# Patient Record
Sex: FEMALE | Race: WHITE | NOT HISPANIC OR LATINO | Employment: FULL TIME | URBAN - METROPOLITAN AREA
[De-identification: names, ages, dates, MRNs, and addresses within clinical notes are randomized per-mention and may not be internally consistent; named-entity substitution may affect disease eponyms.]

---

## 2023-05-04 LAB
AMPHETAMINE (PRESENCE) IN URINE BY SCREEN METHOD: NORMAL
BARBITURATES PRESENCE IN URINE BY SCREEN METHOD: NORMAL
BENZODIAZEPINE (PRESENCE) IN URINE BY SCREEN METHOD: NORMAL
CANNABINOIDS IN URINE BY SCREEN METHOD: NORMAL
COCAINE (PRESENCE) IN URINE BY SCREEN METHOD: NORMAL
DRUG SCREEN COMMENT URINE: NORMAL
FENTANYL URINE: NORMAL
METHADONE (PRESENCE) IN URINE BY SCREEN METHOD: NORMAL
OPIATES (PRESENCE) IN URINE BY SCREEN METHOD: NORMAL
OXYCODONE (PRESENCE) IN URINE BY SCREEN METHOD: NORMAL
PHENCYCLIDINE (PRESENCE) IN URINE BY SCREEN METHOD: NORMAL

## 2023-05-31 LAB
ANION GAP IN SER/PLAS: 12 MMOL/L (ref 10–20)
C REACTIVE PROTEIN (MG/L) IN SER/PLAS BY HIGH SENSIT: 2.6 MG/L
CALCIUM (MG/DL) IN SER/PLAS: 9 MG/DL (ref 8.6–10.6)
CARBON DIOXIDE, TOTAL (MMOL/L) IN SER/PLAS: 29 MMOL/L (ref 21–32)
CHLORIDE (MMOL/L) IN SER/PLAS: 103 MMOL/L (ref 98–107)
CHOLESTEROL (MG/DL) IN SER/PLAS: 214 MG/DL (ref 0–199)
CHOLESTEROL IN HDL (MG/DL) IN SER/PLAS: 44.2 MG/DL
CHOLESTEROL/HDL RATIO: 4.8
CREATININE (MG/DL) IN SER/PLAS: 0.73 MG/DL (ref 0.5–1.05)
GFR FEMALE: >90 ML/MIN/1.73M2
GLUCOSE (MG/DL) IN SER/PLAS: 86 MG/DL (ref 74–99)
LDL: ABNORMAL MG/DL (ref 0–99)
NON HDL CHOLESTEROL: 170 MG/DL
POTASSIUM (MMOL/L) IN SER/PLAS: 3.8 MMOL/L (ref 3.5–5.3)
SODIUM (MMOL/L) IN SER/PLAS: 140 MMOL/L (ref 136–145)
TRIGLYCERIDE (MG/DL) IN SER/PLAS: 431 MG/DL (ref 0–149)
UREA NITROGEN (MG/DL) IN SER/PLAS: 21 MG/DL (ref 6–23)
VLDL: ABNORMAL MG/DL (ref 0–40)

## 2023-06-23 ENCOUNTER — HOSPITAL ENCOUNTER (OUTPATIENT)
Dept: DATA CONVERSION | Facility: HOSPITAL | Age: 63
End: 2023-06-23
Attending: STUDENT IN AN ORGANIZED HEALTH CARE EDUCATION/TRAINING PROGRAM | Admitting: STUDENT IN AN ORGANIZED HEALTH CARE EDUCATION/TRAINING PROGRAM
Payer: COMMERCIAL

## 2023-06-23 DIAGNOSIS — K63.5 POLYP OF COLON: ICD-10-CM

## 2023-06-23 DIAGNOSIS — Z12.11 ENCOUNTER FOR SCREENING FOR MALIGNANT NEOPLASM OF COLON: ICD-10-CM

## 2023-06-23 DIAGNOSIS — J44.9 CHRONIC OBSTRUCTIVE PULMONARY DISEASE, UNSPECIFIED (MULTI): ICD-10-CM

## 2023-06-23 DIAGNOSIS — F17.210 NICOTINE DEPENDENCE, CIGARETTES, UNCOMPLICATED: ICD-10-CM

## 2023-07-03 LAB
COMPLETE PATHOLOGY REPORT: NORMAL
CONVERTED CLINICAL DIAGNOSIS-HISTORY: NORMAL
CONVERTED FINAL DIAGNOSIS: NORMAL
CONVERTED FINAL REPORT PDF LINK TO COPY AND PASTE: NORMAL
CONVERTED GROSS DESCRIPTION: NORMAL

## 2023-10-12 PROBLEM — E78.5 HYPERLIPEMIA: Status: ACTIVE | Noted: 2023-10-12

## 2023-10-12 PROBLEM — D69.6 THROMBOCYTOPENIA (CMS-HCC): Status: ACTIVE | Noted: 2023-10-12

## 2023-10-12 PROBLEM — S33.5XXA LUMBAR SPRAIN: Status: ACTIVE | Noted: 2023-10-12

## 2023-10-12 PROBLEM — J44.9 COPD (CHRONIC OBSTRUCTIVE PULMONARY DISEASE) (MULTI): Status: ACTIVE | Noted: 2023-10-12

## 2023-10-12 PROBLEM — D45 POLYCYTHEMIA VERA (MULTI): Status: ACTIVE | Noted: 2023-10-12

## 2023-10-12 PROBLEM — R53.83 FATIGUE: Status: ACTIVE | Noted: 2023-10-12

## 2023-10-12 PROBLEM — H47.392: Status: ACTIVE | Noted: 2023-10-12

## 2023-10-12 PROBLEM — M54.16 LUMBAR RADICULOPATHY: Status: ACTIVE | Noted: 2023-10-12

## 2023-10-12 PROBLEM — M99.02 SEGMENTAL AND SOMATIC DYSFUNCTION OF THORACIC REGION: Status: ACTIVE | Noted: 2023-10-12

## 2023-10-12 PROBLEM — F17.200 CURRENT EVERY DAY SMOKER: Status: ACTIVE | Noted: 2023-10-12

## 2023-10-12 PROBLEM — G47.33 OSA (OBSTRUCTIVE SLEEP APNEA): Status: ACTIVE | Noted: 2023-10-12

## 2023-10-12 PROBLEM — E16.2 HYPOGLYCEMIA: Status: ACTIVE | Noted: 2023-10-12

## 2023-10-12 PROBLEM — G62.9 NEUROPATHY: Status: ACTIVE | Noted: 2023-10-12

## 2023-10-12 PROBLEM — J45.909 ASTHMA (HHS-HCC): Status: ACTIVE | Noted: 2023-10-12

## 2023-10-12 PROBLEM — I10 HTN (HYPERTENSION): Status: ACTIVE | Noted: 2023-10-12

## 2023-10-12 PROBLEM — E66.01 CLASS 3 SEVERE OBESITY WITH SERIOUS COMORBIDITY AND BODY MASS INDEX (BMI) OF 40.0 TO 44.9 IN ADULT (MULTI): Status: ACTIVE | Noted: 2023-10-12

## 2023-10-12 PROBLEM — E66.813 CLASS 3 SEVERE OBESITY WITH SERIOUS COMORBIDITY AND BODY MASS INDEX (BMI) OF 40.0 TO 44.9 IN ADULT: Status: ACTIVE | Noted: 2023-10-12

## 2023-10-12 PROBLEM — F41.9 ANXIETY: Status: ACTIVE | Noted: 2023-10-12

## 2023-10-12 PROBLEM — B07.9 WARTS: Status: ACTIVE | Noted: 2023-10-12

## 2023-10-12 PROBLEM — M99.05 SEGMENTAL AND SOMATIC DYSFUNCTION OF PELVIC REGION: Status: ACTIVE | Noted: 2023-10-12

## 2023-10-12 PROBLEM — B07.9 CUTANEOUS WART: Status: ACTIVE | Noted: 2023-10-12

## 2023-10-12 PROBLEM — E55.9 VITAMIN D DEFICIENCY: Status: ACTIVE | Noted: 2023-10-12

## 2023-10-12 PROBLEM — H43.813 PVD (POSTERIOR VITREOUS DETACHMENT), BOTH EYES: Status: ACTIVE | Noted: 2023-10-12

## 2023-10-12 PROBLEM — R63.4 WEIGHT REDUCTION: Status: ACTIVE | Noted: 2023-10-12

## 2023-10-12 PROBLEM — M47.816 LUMBAR SPONDYLOSIS: Status: ACTIVE | Noted: 2023-10-12

## 2023-10-12 PROBLEM — D75.1 POLYCYTHEMIA: Status: ACTIVE | Noted: 2023-10-12

## 2023-10-12 PROBLEM — K27.9 PUD (PEPTIC ULCER DISEASE): Status: ACTIVE | Noted: 2023-10-12

## 2023-10-12 PROBLEM — I25.10 CAD (CORONARY ARTERY DISEASE): Status: ACTIVE | Noted: 2023-10-12

## 2023-10-12 PROBLEM — E66.01 MORBID OBESITY WITH BMI OF 40.0-44.9, ADULT (MULTI): Status: ACTIVE | Noted: 2023-10-12

## 2023-10-12 PROBLEM — J32.9 CHRONIC SINUSITIS: Status: ACTIVE | Noted: 2023-10-12

## 2023-10-12 PROBLEM — M99.03 LUMBOSACRAL DYSFUNCTION: Status: ACTIVE | Noted: 2023-10-12

## 2023-10-12 RX ORDER — PANTOPRAZOLE SODIUM 40 MG/1
1 TABLET, DELAYED RELEASE ORAL DAILY
COMMUNITY
Start: 2018-08-29 | End: 2024-02-16 | Stop reason: SDUPTHER

## 2023-10-12 RX ORDER — TOPIRAMATE 50 MG/1
1 TABLET, FILM COATED ORAL 2 TIMES DAILY
COMMUNITY
Start: 2022-03-23 | End: 2023-12-12 | Stop reason: ALTCHOICE

## 2023-10-12 RX ORDER — ACETAMINOPHEN 500 MG
1 TABLET ORAL DAILY
COMMUNITY
Start: 2014-09-15

## 2023-10-12 RX ORDER — ATORVASTATIN CALCIUM 80 MG/1
80 TABLET, FILM COATED ORAL DAILY
COMMUNITY
Start: 2023-05-31 | End: 2023-12-12 | Stop reason: ALTCHOICE

## 2023-10-12 RX ORDER — LIDOCAINE 50 MG/G
1 PATCH TOPICAL DAILY
COMMUNITY
Start: 2016-03-23 | End: 2023-12-12 | Stop reason: ALTCHOICE

## 2023-10-12 RX ORDER — NABUMETONE 750 MG/1
750 TABLET, FILM COATED ORAL EVERY 12 HOURS
COMMUNITY
Start: 2023-05-04 | End: 2024-01-03

## 2023-10-12 RX ORDER — POLYETHYLENE GLYCOL 3350, SODIUM CHLORIDE, SODIUM BICARBONATE, POTASSIUM CHLORIDE 420; 11.2; 5.72; 1.48 G/4L; G/4L; G/4L; G/4L
POWDER, FOR SOLUTION ORAL
COMMUNITY
Start: 2023-06-22 | End: 2023-12-12 | Stop reason: ALTCHOICE

## 2023-10-12 RX ORDER — NALTREXONE HYDROCHLORIDE 50 MG/1
50 TABLET, FILM COATED ORAL DAILY
COMMUNITY
End: 2023-12-12 | Stop reason: ALTCHOICE

## 2023-10-12 RX ORDER — ALBUTEROL SULFATE 90 UG/1
2 AEROSOL, METERED RESPIRATORY (INHALATION) AS NEEDED
COMMUNITY
Start: 2019-05-28

## 2023-10-12 RX ORDER — DULOXETIN HYDROCHLORIDE 60 MG/1
1 CAPSULE, DELAYED RELEASE ORAL DAILY
COMMUNITY
Start: 2016-11-23 | End: 2023-11-22

## 2023-10-12 RX ORDER — TELMISARTAN 20 MG/1
20 TABLET ORAL DAILY
COMMUNITY
Start: 2023-05-05 | End: 2024-02-16 | Stop reason: SDUPTHER

## 2023-10-12 RX ORDER — CLONIDINE 0.3 MG/24H
1 PATCH, EXTENDED RELEASE TRANSDERMAL
COMMUNITY
Start: 2018-12-14 | End: 2023-12-12 | Stop reason: ALTCHOICE

## 2023-10-12 RX ORDER — BENZONATATE 200 MG/1
200 CAPSULE ORAL EVERY 8 HOURS PRN
COMMUNITY
Start: 2023-01-02 | End: 2023-12-12 | Stop reason: ALTCHOICE

## 2023-10-12 RX ORDER — PREDNISONE 20 MG/1
20 TABLET ORAL
COMMUNITY
Start: 2023-05-04 | End: 2023-12-12 | Stop reason: ALTCHOICE

## 2023-10-12 RX ORDER — MULTIVITAMIN WITH IRON
1 TABLET ORAL DAILY
COMMUNITY
Start: 2014-09-15

## 2023-10-12 NOTE — PROGRESS NOTES
Assessment/Plan   The patient's low back pain is consistent with discogenic and radicular pain affecting S1 considering she has radiating symptoms and mild evidence of neurologic deficit in the lower extremities however for the most part her strength is intact. Her neck pain is more consistent with mild radicular pain considering the burning pain into the right periscapular region however there are no overt neurologic deficits in the upper extremities. In general she has chronic pain and has struggled to get rid of her symptoms despite extensive treatment including medications pain management interventions and conservative physical therapy. My treatment will involve very gentle spinal manipulation focusing on thoracolumbar and SIJ regions, with or without dry needling. The patient cannot lie supine so every treatment will be either prone or seated. Patient will be comanaged including pain management/PMR, may need updated L/S MRI pending SCS removal.      Visits this year: 6    Subjective   Patient reports that she is getting a lot of relief from her treatments noting that she felt better after last visit and in general has not had any recent lower extremity pain.  Symptoms are localized to the lower back.  Pain was mild to moderate up until a couple of days ago when symptoms flared to 7 out of 10 becoming more frequent as well without specific injury.  Still working his usual doing a lot of standing and also has been stretching a lot more since her last visit as advised    HPI - LBP 06/07/2023 -patient reports frequent 7 out of 10 low back pain with intermittent episodes of radiating symptoms into the bilateral gluteal regions posterior thighs. patient reports chronic low back pain since 2008, when she was working getting a box from the freezer felt a pop in the lower back was diagnosed with lumbar disc herniation and sprain, was going through Worker's Comp. Had IX nerve blocks which did not provide relief and was  also being prescribed pain medications such as oxycodone which she took sparingly and now is no longer taking. She also had spinal cord stimulator implanted which initially helped but then had to have it repaired as it stopped working. Currently it is no longer working and she is planning to have it removed. Also underwent aquatic therapy. Has not had any manual therapy or chiropractic treatment thus far     Neck pain 06/07/2023 -patient reports chronic burning pain around the right periscapular region and base of the neck. Denies any specific neck injury or trauma. No radiating pain into the upper extremity distal to the shoulder.    Review of Systems   Constitutional:  Negative for fever.   Eyes:  Negative for visual disturbance.   Respiratory:  Negative for shortness of breath.    Cardiovascular:  Negative for chest pain.   Gastrointestinal:  Negative for diarrhea, nausea and vomiting.   Genitourinary:  Negative for difficulty urinating and dysuria.   Skin:  Negative for color change.   Neurological:  Negative for dizziness.   Psychiatric/Behavioral:  Negative for agitation.    All other systems reviewed and are negative.      Objective   Examination findings (e.g., palpation & ROM): Decreased C/S and L/S ROM with pain, hypertonic and tender cervical and lumbar erectors   SLR BL 50, relief with knee-chest position    Segmental joint dysfunction was identified in the following areas using motion palpation and/or pain provocation assessment:  Cervical:   Thoracic: 5-10  Lumbopelvic: 1-2, 3 & BL SIJ     Physical Exam    Plan   Today's treatment:  SMT to regions of segmental dysfunction identified on exam, using age-appropriate force, and manual diversified technique.   Manual dynamic stretching of the gluteal muscles, hamstrings  10:35 to 10:50 AM  Patient noted pain was alleviated post-treatment    Treatment Plan:   The patient and I discussed the risks and benefits of chiropractic care. Based on the patient's  subjective complaints along with the examination findings, it is advised that a course of chiropractic treatment by initiated. Consent for care was given both written and orally by the patient. The patient tolerated today's treatment with little or no additional discomfort and was instructed to contact the office for questions or concerns. Will see patient once per week then every 2 weeks when symptoms become mild/manageable, further spaced apart contingent upon improvement.     This chart note was generated using dictation software, and as such, there may be typographical errors present. Abbreviations: Cervical spine (CS), Dry needling (DN), Flexion adduction internal rotation (FAIR), high velocity, low amplitude (HVLA), Lumbar spine (LS), Soft tissue manipulation (STM), spinal manipulative therapy (SMT), Straight leg raise (SLR), Thoracic spine (TS).

## 2023-10-13 ENCOUNTER — ALLIED HEALTH (OUTPATIENT)
Dept: INTEGRATIVE MEDICINE | Facility: CLINIC | Age: 63
End: 2023-10-13
Payer: COMMERCIAL

## 2023-10-13 DIAGNOSIS — M99.05 SOMATIC DYSFUNCTION OF PELVIS REGION: ICD-10-CM

## 2023-10-13 DIAGNOSIS — G89.29 CHRONIC BILATERAL LOW BACK PAIN WITH BILATERAL SCIATICA: ICD-10-CM

## 2023-10-13 DIAGNOSIS — M99.03 SOMATIC DYSFUNCTION OF LUMBAR REGION: ICD-10-CM

## 2023-10-13 DIAGNOSIS — M54.42 CHRONIC BILATERAL LOW BACK PAIN WITH BILATERAL SCIATICA: ICD-10-CM

## 2023-10-13 DIAGNOSIS — M99.01 CERVICAL SEGMENT DYSFUNCTION: Primary | ICD-10-CM

## 2023-10-13 DIAGNOSIS — M54.16 LUMBAR RADICULOPATHY: ICD-10-CM

## 2023-10-13 DIAGNOSIS — M54.41 CHRONIC BILATERAL LOW BACK PAIN WITH BILATERAL SCIATICA: ICD-10-CM

## 2023-10-13 PROCEDURE — 97140 MANUAL THERAPY 1/> REGIONS: CPT | Performed by: CHIROPRACTOR

## 2023-10-13 PROCEDURE — 98941 CHIROPRACT MANJ 3-4 REGIONS: CPT | Performed by: CHIROPRACTOR

## 2023-10-13 ASSESSMENT — ENCOUNTER SYMPTOMS
NAUSEA: 0
COLOR CHANGE: 0
FEVER: 0
DIZZINESS: 0
DIARRHEA: 0
DIFFICULTY URINATING: 0
SHORTNESS OF BREATH: 0
VOMITING: 0
DYSURIA: 0
AGITATION: 0

## 2023-10-20 ENCOUNTER — APPOINTMENT (OUTPATIENT)
Dept: PAIN MEDICINE | Facility: CLINIC | Age: 63
End: 2023-10-20
Payer: COMMERCIAL

## 2023-10-27 ENCOUNTER — OFFICE VISIT (OUTPATIENT)
Dept: PAIN MEDICINE | Facility: CLINIC | Age: 63
End: 2023-10-27
Payer: COMMERCIAL

## 2023-10-27 VITALS
TEMPERATURE: 95.9 F | RESPIRATION RATE: 20 BRPM | DIASTOLIC BLOOD PRESSURE: 101 MMHG | WEIGHT: 267 LBS | HEIGHT: 66 IN | BODY MASS INDEX: 42.91 KG/M2 | OXYGEN SATURATION: 99 % | SYSTOLIC BLOOD PRESSURE: 208 MMHG | HEART RATE: 74 BPM

## 2023-10-27 DIAGNOSIS — T85.192A MALFUNCTION OF SPINAL CORD STIMULATOR, INITIAL ENCOUNTER (CMS-HCC): Primary | ICD-10-CM

## 2023-10-27 PROCEDURE — 3080F DIAST BP >= 90 MM HG: CPT | Performed by: ANESTHESIOLOGY

## 2023-10-27 PROCEDURE — 3008F BODY MASS INDEX DOCD: CPT | Performed by: ANESTHESIOLOGY

## 2023-10-27 PROCEDURE — 99204 OFFICE O/P NEW MOD 45 MIN: CPT | Performed by: ANESTHESIOLOGY

## 2023-10-27 PROCEDURE — 3077F SYST BP >= 140 MM HG: CPT | Performed by: ANESTHESIOLOGY

## 2023-10-27 PROCEDURE — 99214 OFFICE O/P EST MOD 30 MIN: CPT | Performed by: ANESTHESIOLOGY

## 2023-10-27 ASSESSMENT — ENCOUNTER SYMPTOMS
OCCASIONAL FEELINGS OF UNSTEADINESS: 0
DEPRESSION: 0
LOSS OF SENSATION IN FEET: 0

## 2023-10-27 ASSESSMENT — PATIENT HEALTH QUESTIONNAIRE - PHQ9
SUM OF ALL RESPONSES TO PHQ9 QUESTIONS 1 AND 2: 0
1. LITTLE INTEREST OR PLEASURE IN DOING THINGS: NOT AT ALL
2. FEELING DOWN, DEPRESSED OR HOPELESS: NOT AT ALL

## 2023-10-27 ASSESSMENT — LIFESTYLE VARIABLES: TOTAL SCORE: 1

## 2023-10-27 ASSESSMENT — COLUMBIA-SUICIDE SEVERITY RATING SCALE - C-SSRS
1. IN THE PAST MONTH, HAVE YOU WISHED YOU WERE DEAD OR WISHED YOU COULD GO TO SLEEP AND NOT WAKE UP?: NO
6. HAVE YOU EVER DONE ANYTHING, STARTED TO DO ANYTHING, OR PREPARED TO DO ANYTHING TO END YOUR LIFE?: NO
2. HAVE YOU ACTUALLY HAD ANY THOUGHTS OF KILLING YOURSELF?: NO

## 2023-10-27 ASSESSMENT — PAIN - FUNCTIONAL ASSESSMENT: PAIN_FUNCTIONAL_ASSESSMENT: 0-10

## 2023-10-27 ASSESSMENT — PAIN SCALES - GENERAL
PAINLEVEL: 5
PAINLEVEL_OUTOF10: 5 - MODERATE PAIN

## 2023-10-27 ASSESSMENT — PAIN DESCRIPTION - DESCRIPTORS: DESCRIPTORS: ACHING;STABBING

## 2023-10-27 NOTE — PROGRESS NOTES
Chief Complaint   Patient presents with    New Patient Visit     Patient has an implanted TENS unit and would like it removed.     History of Present Complaint:  The patient was referred to us by Referring Provider: Patient uncertain of who referred her here to this clinic however patient was a previous patient to [facility] . this is 62 y.o.  female  Not accompanied by another person with a past history of  hypertension hyperlipidemia CAD thrombocytopenia chronic sinusitis hypoglycemia vitamin D deficiency class III severe obesity PVD peptic ulcer history of anxiety lumbar strain thoracic dysfunction pelvic region somatic dysfunction lumbar sacral dysfunction neuropathy lumbar spondylosis asthma COPD obstruction sleep apnea current everyday smoker.   presenting with Lower back pain    Pain started started 6 years ago Pain is Worse patient did have a TENS unit implants however it stopped working and she would like it removed.    The pain is described as sharp, stabbing, and intermittent  and is relieved by Medications patient deals with her pain occasionally when it is really bad she will take oxycodone which is about 4 times ii a year      Prior Pain Therapies: chronic opioid therapy  , physical Therapy  , Injections  , chiropractic care  , and aquatic therapy  Past surgical history:   Hysterectomy, wrist bilateral wrist carpal tunnel, cyst removal from the right breast, bilateral heel surgery, 1 , tubal ligation and the [place] implant    Work Hx/litigation:  Patient currently works at [employer] full-time dayshift 28 hours a week  Social history: , Has 3children, 9grandchildren and 0great-grandchildren, and Finished high school    Diagnostic studies: none    Opioid Risk Assessment Score

## 2023-10-27 NOTE — PROGRESS NOTES
There is no referral in the system.  This patient is morbidly obese with BMI 43 who has been treated by Karlo Le.  The OARRS showed few Percocet last year but nothing this year.  Recently started on naltrexone 50 mg from [facility] may be for small dose naltrexone for fibromyalgia etc.: Most likely IV infusion therapy      History of Present Complaint:  The patient was referred to us by Referring Provider: Cough referral previous patient of [facility] who had spinal cord stimulator implanted years ago. this is 62 y.o.  female Not accompanied by another personwith a past history of anxiety, morbidly obese BMI 43, smoker, COPD, polycythemia, presenting with nonfunctioning spinal cord stimulator with depleted battery with a desire to take it out.  The patient stated that her pain is not really that bad anymore and the spinal cord stimulator is been off for almost more than couple of years and she would like it out.  We discussed revisiting the system with the newer Infinion leads but the patient adamant that she does not have really any major problem would like it out.  We will proceed with scheduling her for removal of spinal cord stimulator.    Prior Pain Therapies: Spinal cord stimulator [employer] 2012 implant, revision of an IPG with the Spectra generator in 2018    Past surgical history: None pertaining to her musculoskeletal beside the spinal cord stimulator implant           Procedures:   Multiple injections in the past followed by spinal cord stimulator implant in 2012 and revised in 2018    Portions of record reviewed for pertinent issues: active problem list, medication list, allergies, family history, social history, notes from last encounter, encounters, lab results, imaging and other available records.    I have personally reviewed the OARRS report for this patient. This report is scanned into the electronic medical record. I have considered the risks of abuse, dependence, addiction and  diversion. It showed: No opioid   OPIOID RISK ASSESSMENT SCORE 1/26  Aberrant behavior: None    Work Hx/litigation:  Patient currently works at [employer] full-time dayshift 28 hours a week  Social history: , Has 3children, 9grandchildren, finished high school, denies smoking drinking or use of illicit drugs       There were no vitals taken for this visit.   Review of Systems   HENT: Negative.     Eyes: Negative.    Respiratory: Negative.     Cardiovascular: Negative.    Gastrointestinal: Negative.    Endocrine: Negative.    Genitourinary: Negative.    Musculoskeletal:  Positive for back pain.   Skin: Negative.    Neurological: Negative.    Hematological: Negative.    Psychiatric/Behavioral: Negative.        Physical Exam  Vitals and nursing note reviewed.   Constitutional:       Appearance: Normal appearance.          Comments: Well-healed spinal cord stimulator incisions with a pocket in the left gluteal area   HENT:      Head: Normocephalic and atraumatic.      Nose: Nose normal.   Eyes:      Extraocular Movements: Extraocular movements intact.      Conjunctiva/sclera: Conjunctivae normal.      Pupils: Pupils are equal, round, and reactive to light.   Cardiovascular:      Rate and Rhythm: Normal rate and regular rhythm.      Pulses: Normal pulses.      Heart sounds: Normal heart sounds.   Pulmonary:      Effort: Pulmonary effort is normal.      Breath sounds: Normal breath sounds.   Abdominal:      General: Abdomen is flat. Bowel sounds are normal.      Palpations: Abdomen is soft.   Musculoskeletal:         General: Tenderness present.   Skin:     General: Skin is warm.   Neurological:      General: No focal deficit present.      Mental Status: She is alert and oriented to person, place, and time.   Psychiatric:         Mood and Affect: Mood normal.         Behavior: Behavior normal.            Assessment  This is a very pleasant 59 years old morbidly obese with very active had spinal cord stimulator  implanted in 2012 then was revised in 2018 now stating that her stimulator has not been working for the last 2 years and wants it out.  The patient stated her pain now is under control she is not really in severe pain and she would not consider revising the IPG.  We will schedule patient for removal of the leads and IPG as soon as possible.           Plan  At least 50% of the visit was involved in the discussion of the options for treatment. We discussed exercises, medication, interventional therapies and surgery. Healthy life style is essential with patient hard work to achieve the wellness. In addition; discussion with the patient and/or family about any of the diagnostic results, impressions and/or recommended diagnostic studies, prognosis, risks and benefits of treatment options, instructions for treatment and/or follow-up, importance of compliance with chosen treatment options, risk-factor reduction, and patient/family education.         Pool therapy, walking in the pool, at least 3x per week for 30 minutes  Plan for removal of spinal cord stimulator reads and IPG  Discussed healthy lifestyle anti-inflammatory diet and weight control  Alternative chronic pain therapies was discussed, encouraged and information was handed  Return to Clinic 3 months     *Please note this report has been produced using speech recognition software and may contain errors related to that system including grammar, punctuation and spelling as well as words and phrases that may be inappropriate. If there are questions or concerns, please feel free to contact me to clarify.    Kristin Ivy MD

## 2023-10-28 ASSESSMENT — ENCOUNTER SYMPTOMS
HEMATOLOGIC/LYMPHATIC NEGATIVE: 1
ENDOCRINE NEGATIVE: 1
EYES NEGATIVE: 1
RESPIRATORY NEGATIVE: 1
BACK PAIN: 1
NEUROLOGICAL NEGATIVE: 1
GASTROINTESTINAL NEGATIVE: 1
CARDIOVASCULAR NEGATIVE: 1
PSYCHIATRIC NEGATIVE: 1

## 2023-11-10 ENCOUNTER — TELEPHONE (OUTPATIENT)
Dept: PAIN MEDICINE | Facility: CLINIC | Age: 63
End: 2023-11-10

## 2023-11-10 ENCOUNTER — APPOINTMENT (OUTPATIENT)
Dept: INTEGRATIVE MEDICINE | Facility: CLINIC | Age: 63
End: 2023-11-10
Payer: COMMERCIAL

## 2023-11-28 PROBLEM — T85.192A MALFUNCTION OF SPINAL CORD STIMULATOR (CMS-HCC): Status: ACTIVE | Noted: 2023-10-27

## 2023-12-12 ENCOUNTER — PRE-ADMISSION TESTING (OUTPATIENT)
Dept: PREADMISSION TESTING | Facility: HOSPITAL | Age: 63
End: 2023-12-12
Payer: COMMERCIAL

## 2023-12-12 VITALS
TEMPERATURE: 97.2 F | BODY MASS INDEX: 43.4 KG/M2 | HEIGHT: 66 IN | DIASTOLIC BLOOD PRESSURE: 87 MMHG | OXYGEN SATURATION: 98 % | SYSTOLIC BLOOD PRESSURE: 180 MMHG | HEART RATE: 77 BPM | WEIGHT: 270.06 LBS | RESPIRATION RATE: 18 BRPM

## 2023-12-12 PROCEDURE — 99203 OFFICE O/P NEW LOW 30 MIN: CPT | Performed by: NURSE PRACTITIONER

## 2023-12-12 ASSESSMENT — DUKE ACTIVITY SCORE INDEX (DASI)
CAN YOU DO LIGHT WORK AROUND THE HOUSE LIKE DUSTING OR WASHING DISHES: YES
CAN YOU WALK A BLOCK OR TWO ON LEVEL GROUND: YES
CAN YOU PARTICIPATE IN STRENOUS SPORTS LIKE SWIMMING, SINGLES TENNIS, FOOTBALL, BASKETBALL, OR SKIING: NO
CAN YOU DO HEAVY WORK AROUND THE HOUSE LIKE SCRUBBING FLOORS OR LIFTING AND MOVING HEAVY FURNITURE: NO
CAN YOU HAVE SEXUAL RELATIONS: NO
CAN YOU PARTICIPATE IN MODERATE RECREATIONAL ACTIVITIES LIKE GOLF, BOWLING, DANCING, DOUBLES TENNIS OR THROWING A BASEBALL OR FOOTBALL: NO
CAN YOU WALK INDOORS, SUCH AS AROUND YOUR HOUSE: YES
CAN YOU TAKE CARE OF YOURSELF (EAT, DRESS, BATHE, OR USE TOILET): YES
CAN YOU CLIMB A FLIGHT OF STAIRS OR WALK UP A HILL: NO
TOTAL_SCORE: 13.45
DASI METS SCORE: 4.4
CAN YOU DO MODERATE WORK AROUND THE HOUSE LIKE VACUUMING, SWEEPING FLOORS OR CARRYING GROCERIES: YES
CAN YOU DO YARD WORK LIKE RAKING LEAVES, WEEDING OR PUSHING A MOWER: NO
CAN YOU RUN A SHORT DISTANCE: NO

## 2023-12-12 NOTE — H&P (VIEW-ONLY)
"CPM/PAT Evaluation       Name: Shila Botello (Shila Botello \"Elinor\")  /Age: 1960/62 y.o.     In-Person       Chief Complaint:     62  yr old female with c/o surgery to remove her spinal cord stimulator.  She states she had a spinal cord stimulator place in  d/t chronic low back pain. She states she heard a \"pop\" in her back, years ago when lifting a box.   She shares that she had a battery change x 1 in 2018.    She shares that it is non-functioning d/t the battery is not working for about 5 years ago.    She states she no longer has the chronic pain and the \"stimulator cannot help the arthritis.\"  She shares that her pain is being managed OTC Advil or Oxy PRN.    She c/o intermittent pain that is located to middle of lower back.  Pain is chronic and her \"back may go out.\"  She denies any infection or issue with the stimulator.    PCP Dr. Corral    Denies any cardiac or respiratory symptoms.  Denies any past issues with anesthesia.    Spinal cord stimulator removal with c-arm is Scheduled on 2023 with Dr. Ivy    Vitals:    23 1259   BP: 180/87   Pulse: 77   Resp: 18   Temp: 36.2 °C (97.2 °F)   SpO2: 98%          Past Medical History:   Diagnosis Date    Abnormal weight loss 2018    Weight reduction    Achilles tendinitis, unspecified leg 2018    Achilles tendinitis    Acute maxillary sinusitis, unspecified 2018    Acute maxillary sinusitis    Anxiety     Anxiety disorder, unspecified 2018    Anxiety    Arthritis     Asthma     Chronic sinusitis, unspecified 2018    Chronic sinusitis    Diabetes mellitus (CMS/[facility])     GERD (gastroesophageal reflux disease)     Heart disease     Hyperlipidemia, unspecified 2018    Hyperlipemia    Hypertension     Hypoglycemia, unspecified 2018    Hypoglycemia    Hypothyroidism     Obstructive sleep apnea (adult) (pediatric) 2018    WHIT (obstructive sleep apnea)    Other fatigue 2018    Fatigue    " Polycythemia vera (CMS/[facility]) 2018    Polycythemia vera    Polyneuropathy, unspecified 2018    Neuropathy    Viral wart, unspecified 2018    Cutaneous wart    Vitamin D deficiency, unspecified 2018    Vitamin D deficiency       Past Surgical History:   Procedure Laterality Date    CARPAL TUNNEL RELEASE       SECTION, LOW TRANSVERSE      HYSTERECTOMY  2018    Hysterectomy    OTHER SURGICAL HISTORY  2018    Breast Surgery Puncture Aspiration Of Cyst Right Breast    AL BREAST AUGMENTATION WITH IMPLANT      TONSILLECTOMY         Patient  has no history on file for sexual activity.    No family history on file.    No Known Allergies    Prior to Admission medications    Medication Sig Start Date End Date Taking? Authorizing Provider   albuterol (Ventolin HFA) 90 mcg/actuation inhaler Inhale 2 puffs if needed. EVERY 4-6 HOURS 19   Historical Provider, MD   aspirin 81 mg capsule Take 1 tablet by mouth once daily. 9/15/14   Historical Provider, MD   atorvastatin (Lipitor) 80 mg tablet Take 1 tablet (80 mg) by mouth once daily. 23   Historical Provider, MD   benzonatate (Tessalon) 200 mg capsule Take 1 capsule (200 mg) by mouth every 8 hours if needed for cough. 23   Historical Provider, MD   cholecalciferol (Vitamin D-3) 5,000 Units tablet Take 1 tablet (5,000 Units) by mouth once daily. 9/15/14   Historical Provider, MD   cloNIDine (Catapres-TTS) 0.3 mg/24 hr patch Place 1 patch on the skin 1 (one) time per week. 18   Historical Provider, MD   DULoxetine (Cymbalta) 60 mg DR capsule Take 1 capsule by mouth daily. 23   Ishaan Ghosh MD   lidocaine (Lidoderm) 5 % patch Place 1 patch on the skin once daily. (MAY WEAR UP TO 12HOURS. 3/23/16   Historical Provider, MD   multivitamin (Multiple Vitamins) tablet Take 1 tablet by mouth once daily. 9/15/14   Historical Provider, MD   nabumetone (Relafen) 750 mg tablet Take 1 tablet (750 mg) by mouth every 12 hours.  5/4/23   Historical Provider, MD   naltrexone (Depade) 50 mg tablet Take 1 tablet (50 mg) by mouth once daily.    Historical Provider, MD   pantoprazole (ProtoNix) 40 mg EC tablet Take 1 tablet (40 mg) by mouth once daily. 8/29/18   Historical Provider, MD   polyethylene glycol-electrolytes 420 gram solution  6/22/23   Historical Provider, MD   predniSONE (Deltasone) 20 mg tablet Take 1 tablet (20 mg) by mouth. 5/4/23   Historical Provider, MD   telmisartan (MIcarDIS) 20 mg tablet Take 1 tablet (20 mg) by mouth once daily. 5/5/23   Historical Provider, MD   topiramate (Topamax) 50 mg tablet Take 1 tablet (50 mg) by mouth 2 times a day. 3/23/22   Historical Provider, MD          Review of Systems  Constitutional: NO F, chills, or sweats  Eyes: no blurred vision or visual disturbance  ENT: congestion with rhinitis, sore throat, difficulty hearing  Cardiovascular: no chest pain, no edema, no palps and no syncope.   Respiratory: + cough, + s.o.b., denies wheezing  Gastrointestinal: no abdominal pain, no N/V, no blood in stools  Genitourinary: no dysuria, no urinary frequency, no urinary hesitancy and no feelings of urinary urgency.   Musculoskeletal: see HPI  Integumentary: no new skin lesions and no rashes.   Neurological: no difficulty walking, no headache, no limb weakness, no numbness and no tingling.   Endocrine: no recent weight gain and no recent weight loss.     Physical Exam  Constitutional:       Appearance: Normal appearance.   HENT:      Head:      Comments: Hoarse voice     Mouth/Throat:      Pharynx: Posterior oropharyngeal erythema present.   Cardiovascular:      Rate and Rhythm: Normal rate.      Heart sounds: Normal heart sounds.   Pulmonary:      Effort: Pulmonary effort is normal.      Breath sounds: Normal breath sounds.   Abdominal:      General: Bowel sounds are normal.      Palpations: Abdomen is soft.   Musculoskeletal:         General: Normal range of motion.      Cervical back: Normal range of  motion.      Right lower leg: No edema.      Left lower leg: No edema.   Skin:     General: Skin is warm and dry.   Neurological:      Mental Status: She is alert and oriented to person, place, and time.          PAT AIRWAY:   Airway:     Mallampati::  III    TM distance::  <3 FB    Neck ROM::  Full      Visit Vitals  /87   Pulse 77   Temp 36.2 °C (97.2 °F) (Temporal)   Resp 18       DASI Risk Score      Flowsheet Row Most Recent Value   DASI SCORE 13.45   METS Score (Will be calculated only when all the questions are answered) 4.4          Caprini DVT Assessment    No data to display       Modified Frailty Index    No data to display       CHADS2 Stroke Risk  Current as of 24 minutes ago        N/A 3 - 100%: High Risk   2 - 3%: Medium Risk   0 - 2%: Low Risk     Last Change: N/A          This score determines the patient's risk of having a stroke if the patient has atrial fibrillation.        This score is not applicable to this patient. Components are not calculated.          Revised Cardiac Risk Index    No data to display       Apfel Simplified Score    No data to display       Risk Analysis Index Results This Encounter    No data found in the last 1 encounters.       Stop Bang Score      Flowsheet Row Most Recent Value   Do you snore loudly? 1   Do you often feel tired or fatigued after your sleep? 0   Has anyone ever observed you stop breathing in your sleep? 0   Do you have or are you being treated for high blood pressure? 1   Recent BMI (Calculated) 43.1   Is BMI greater than 35 kg/m2? 1=Yes   Age older than 50 years old? 1=Yes   Is your neck circumference greater than 17 inches (Male) or 16 inches (Female)? 1   Gender - Male 0=No   STOP-BANG Total Score 5

## 2023-12-12 NOTE — CPM/PAT H&P
"CPM/PAT Evaluation       Name: Shila Botello (Shila Botello \"Elinor\")  /Age: 1960/62 y.o.     In-Person       Chief Complaint:     62  yr old female with c/o surgery to remove her spinal cord stimulator.  She states she had a spinal cord stimulator place in  d/t chronic low back pain. She states she heard a \"pop\" in her back, years ago when lifting a box.   She shares that she had a battery change x 1 in 2018.    She shares that it is non-functioning d/t the battery is not working for about 5 years ago.    She states she no longer has the chronic pain and the \"stimulator cannot help the arthritis.\"  She shares that her pain is being managed OTC Advil or Oxy PRN.    She c/o intermittent pain that is located to middle of lower back.  Pain is chronic and her \"back may go out.\"  She denies any infection or issue with the stimulator.    PCP Dr. Corral    Denies any cardiac or respiratory symptoms.  Denies any past issues with anesthesia.    Spinal cord stimulator removal with c-arm is Scheduled on 2023 with Dr. Ivy    Vitals:    23 1259   BP: 180/87   Pulse: 77   Resp: 18   Temp: 36.2 °C (97.2 °F)   SpO2: 98%          Past Medical History:   Diagnosis Date    Abnormal weight loss 2018    Weight reduction    Achilles tendinitis, unspecified leg 2018    Achilles tendinitis    Acute maxillary sinusitis, unspecified 2018    Acute maxillary sinusitis    Anxiety     Anxiety disorder, unspecified 2018    Anxiety    Arthritis     Asthma     Chronic sinusitis, unspecified 2018    Chronic sinusitis    Diabetes mellitus (CMS/[facility])     GERD (gastroesophageal reflux disease)     Heart disease     Hyperlipidemia, unspecified 2018    Hyperlipemia    Hypertension     Hypoglycemia, unspecified 2018    Hypoglycemia    Hypothyroidism     Obstructive sleep apnea (adult) (pediatric) 2018    WHIT (obstructive sleep apnea)    Other fatigue 2018    Fatigue    " Polycythemia vera (CMS/[facility]) 2018    Polycythemia vera    Polyneuropathy, unspecified 2018    Neuropathy    Viral wart, unspecified 2018    Cutaneous wart    Vitamin D deficiency, unspecified 2018    Vitamin D deficiency       Past Surgical History:   Procedure Laterality Date    CARPAL TUNNEL RELEASE       SECTION, LOW TRANSVERSE      HYSTERECTOMY  2018    Hysterectomy    OTHER SURGICAL HISTORY  2018    Breast Surgery Puncture Aspiration Of Cyst Right Breast    OR BREAST AUGMENTATION WITH IMPLANT      TONSILLECTOMY         Patient  has no history on file for sexual activity.    No family history on file.    No Known Allergies    Prior to Admission medications    Medication Sig Start Date End Date Taking? Authorizing Provider   albuterol (Ventolin HFA) 90 mcg/actuation inhaler Inhale 2 puffs if needed. EVERY 4-6 HOURS 19   Historical Provider, MD   aspirin 81 mg capsule Take 1 tablet by mouth once daily. 9/15/14   Historical Provider, MD   atorvastatin (Lipitor) 80 mg tablet Take 1 tablet (80 mg) by mouth once daily. 23   Historical Provider, MD   benzonatate (Tessalon) 200 mg capsule Take 1 capsule (200 mg) by mouth every 8 hours if needed for cough. 23   Historical Provider, MD   cholecalciferol (Vitamin D-3) 5,000 Units tablet Take 1 tablet (5,000 Units) by mouth once daily. 9/15/14   Historical Provider, MD   cloNIDine (Catapres-TTS) 0.3 mg/24 hr patch Place 1 patch on the skin 1 (one) time per week. 18   Historical Provider, MD   DULoxetine (Cymbalta) 60 mg DR capsule Take 1 capsule by mouth daily. 23   Ishaan Ghosh MD   lidocaine (Lidoderm) 5 % patch Place 1 patch on the skin once daily. (MAY WEAR UP TO 12HOURS. 3/23/16   Historical Provider, MD   multivitamin (Multiple Vitamins) tablet Take 1 tablet by mouth once daily. 9/15/14   Historical Provider, MD   nabumetone (Relafen) 750 mg tablet Take 1 tablet (750 mg) by mouth every 12 hours.  5/4/23   Historical Provider, MD   naltrexone (Depade) 50 mg tablet Take 1 tablet (50 mg) by mouth once daily.    Historical Provider, MD   pantoprazole (ProtoNix) 40 mg EC tablet Take 1 tablet (40 mg) by mouth once daily. 8/29/18   Historical Provider, MD   polyethylene glycol-electrolytes 420 gram solution  6/22/23   Historical Provider, MD   predniSONE (Deltasone) 20 mg tablet Take 1 tablet (20 mg) by mouth. 5/4/23   Historical Provider, MD   telmisartan (MIcarDIS) 20 mg tablet Take 1 tablet (20 mg) by mouth once daily. 5/5/23   Historical Provider, MD   topiramate (Topamax) 50 mg tablet Take 1 tablet (50 mg) by mouth 2 times a day. 3/23/22   Historical Provider, MD          Review of Systems  Constitutional: NO F, chills, or sweats  Eyes: no blurred vision or visual disturbance  ENT: congestion with rhinitis, sore throat, difficulty hearing  Cardiovascular: no chest pain, no edema, no palps and no syncope.   Respiratory: + cough, + s.o.b., denies wheezing  Gastrointestinal: no abdominal pain, no N/V, no blood in stools  Genitourinary: no dysuria, no urinary frequency, no urinary hesitancy and no feelings of urinary urgency.   Musculoskeletal: see HPI  Integumentary: no new skin lesions and no rashes.   Neurological: no difficulty walking, no headache, no limb weakness, no numbness and no tingling.   Endocrine: no recent weight gain and no recent weight loss.     Physical Exam  Constitutional:       Appearance: Normal appearance.   HENT:      Head:      Comments: Hoarse voice     Mouth/Throat:      Pharynx: Posterior oropharyngeal erythema present.   Cardiovascular:      Rate and Rhythm: Normal rate.      Heart sounds: Normal heart sounds.   Pulmonary:      Effort: Pulmonary effort is normal.      Breath sounds: Normal breath sounds.   Abdominal:      General: Bowel sounds are normal.      Palpations: Abdomen is soft.   Musculoskeletal:         General: Normal range of motion.      Cervical back: Normal range of  motion.      Right lower leg: No edema.      Left lower leg: No edema.   Skin:     General: Skin is warm and dry.   Neurological:      Mental Status: She is alert and oriented to person, place, and time.          PAT AIRWAY:   Airway:     Mallampati::  III    TM distance::  <3 FB    Neck ROM::  Full      Visit Vitals  /87   Pulse 77   Temp 36.2 °C (97.2 °F) (Temporal)   Resp 18       DASI Risk Score      Flowsheet Row Most Recent Value   DASI SCORE 13.45   METS Score (Will be calculated only when all the questions are answered) 4.4          Caprini DVT Assessment    No data to display       Modified Frailty Index    No data to display       CHADS2 Stroke Risk  Current as of 24 minutes ago        N/A 3 - 100%: High Risk   2 - 3%: Medium Risk   0 - 2%: Low Risk     Last Change: N/A          This score determines the patient's risk of having a stroke if the patient has atrial fibrillation.        This score is not applicable to this patient. Components are not calculated.          Revised Cardiac Risk Index    No data to display       Apfel Simplified Score    No data to display       Risk Analysis Index Results This Encounter    No data found in the last 1 encounters.       Stop Bang Score      Flowsheet Row Most Recent Value   Do you snore loudly? 1   Do you often feel tired or fatigued after your sleep? 0   Has anyone ever observed you stop breathing in your sleep? 0   Do you have or are you being treated for high blood pressure? 1   Recent BMI (Calculated) 43.1   Is BMI greater than 35 kg/m2? 1=Yes   Age older than 50 years old? 1=Yes   Is your neck circumference greater than 17 inches (Male) or 16 inches (Female)? 1   Gender - Male 0=No   STOP-BANG Total Score 5

## 2023-12-12 NOTE — PREPROCEDURE INSTRUCTIONS
Medication List            Accurate as of December 12, 2023  1:13 PM. Always use your most recent med list.                aspirin 81 mg capsule  Medication Adjustments for Surgery: Take morning of surgery with sip of water, no other fluids     cholecalciferol 5,000 Units tablet  Commonly known as: Vitamin D-3  Medication Adjustments for Surgery: Stop 7 days before surgery     DULoxetine 60 mg DR capsule  Commonly known as: Cymbalta  Take 1 capsule by mouth daily.  Medication Adjustments for Surgery: Take morning of surgery with sip of water, no other fluids     Multiple Vitamins tablet  Generic drug: multivitamin  Medication Adjustments for Surgery: Stop 7 days before surgery     nabumetone 750 mg tablet  Commonly known as: Relafen  Medication Adjustments for Surgery: Continue until night before surgery     pantoprazole 40 mg EC tablet  Commonly known as: ProtoNix  Medication Adjustments for Surgery: Take morning of surgery with sip of water, no other fluids     telmisartan 20 mg tablet  Commonly known as: MIcarDIS  Medication Adjustments for Surgery: Take morning of surgery with sip of water, no other fluids     Ventolin HFA 90 mcg/actuation inhaler  Generic drug: albuterol  Medication Adjustments for Surgery: Continue until night before surgery                        PRE-OPERATIVE INSTRUCTIONS FOR SURGERY    *Do not eat anything after midnight the night of surgery.  This includes food of any kind (including hard candy, cough drops, mints and gum) and beverages (including coffee and soda).  You may drink up to one 8 ounce glass of water up until three hours before your scheduled surgery time.      *When you arrive at the hospital-->GO TO Registration on the ground floor ARRIVE IN REGISTRATION AT 1130AM THEN PROCEED TO OUTPT SURGERY     *Stop smoking 24 hours prior to surgery.  No Marijuana, CBD Oil or Vaping for 48 hours  *No alcohol 24 hours prior to surgery  *You will need a responsible adult to drive you  home  -No acrylic nails or nail polish on at least one fingernail, NO polish on toes for foot surgery  -You may be asked to remove your dentures, partial plate, eyeglasses or contact lenses before going to surgery.  Please bring a case for these items.  -Body piercings need to be removed.  Jewelry and valuables should be left at home.  -Put on loose,  comfortable, clean clothing, that will accommodate bandages    HOME PREOPERATIVE ANTIBACTERIAL SHOWER:  -This shower is a way of cleaning the skin with germ killing solution before surgery.  The solution contains Chorhexidine (CHG).   Let your Dr. Know if you are allergic to Chlorhexidine.    NIGHT BEFORE SURGERY:    -Take a normal shower and wash your hair  -Turn OFF water to avoid rinsing the antibacterial skin cleanser off (CHG).  -Apply the CHG cleanser to a clean and wet washcloth.  Lather your entire body from the neck down.    -DO NOT USE ON THE HEAD, FACE, or GENITALS.  -RINSE immediately if CHG is in contact with your eyes, ears or mouth  -Gently wash your body.  Have the CHG cleanser stay on your skin for 3 MINUTES.  -After 3 minutes, turn on water and rinse the CHG cleanser off your body completely  -DO NOT WASH with regular soap after using CHG.  -PAT DRY with a clean fresh towel  -DO NOT apply any raymundo, deodorants or lotions  -Dress in clean night cloths  **CHG cleanser will cause stains to fabrics if you wash them with bleach after use.     DAY OF SURGERY:  -Take shower-->JUST GET WET.  Turn OFF water.  -REPEAT the CHG cleanse as you did the night before.  -PAT DRY-->        NPO Instructions:    Do not eat any food after midnight the night before your surgery/procedure.    Additional Instructions:     Day of Surgery:  Wear  comfortable loose fitting clothing  Do not use moisturizers, creams, lotions or perfume  All jewelry and valuables should be left at home

## 2023-12-12 NOTE — PREPROCEDURE INSTRUCTIONS
Medication List            Accurate as of December 12, 2023  1:19 PM. Always use your most recent med list.                aspirin 81 mg capsule  Medication Adjustments for Surgery: Stop 7 days before surgery     cholecalciferol 5,000 Units tablet  Commonly known as: Vitamin D-3  Medication Adjustments for Surgery: Stop 7 days before surgery     DULoxetine 60 mg DR capsule  Commonly known as: Cymbalta  Take 1 capsule by mouth daily.  Medication Adjustments for Surgery: Take morning of surgery with sip of water, no other fluids     Multiple Vitamins tablet  Generic drug: multivitamin  Medication Adjustments for Surgery: Stop 7 days before surgery     nabumetone 750 mg tablet  Commonly known as: Relafen  Medication Adjustments for Surgery: Continue until night before surgery     pantoprazole 40 mg EC tablet  Commonly known as: ProtoNix  Medication Adjustments for Surgery: Take morning of surgery with sip of water, no other fluids     telmisartan 20 mg tablet  Commonly known as: MIcarDIS  Medication Adjustments for Surgery: Take morning of surgery with sip of water, no other fluids     Ventolin HFA 90 mcg/actuation inhaler  Generic drug: albuterol  Medication Adjustments for Surgery: Continue until night before surgery                            PRE-OPERATIVE INSTRUCTIONS FOR SURGERY    *Do not eat anything after midnight the night of surgery.  This includes food of any kind (including hard candy, cough drops, mints and gum) and beverages (including coffee and soda).  You may drink up to one 8 ounce glass of water up until three hours before your scheduled surgery time.    [facility] at [facility] at 1130am   *When you arrive at the hospital-->GO TO Registration on the ground floor  *Stop smoking 24 hours prior to surgery.  No Marijuana, CBD Oil or Vaping for 48 hours  *No alcohol 24 hours prior to surgery  *You will need a responsible adult to drive you home  -No acrylic nails or nail polish on at least one  fingernail, NO polish on toes for foot surgery  -You may be asked to remove your dentures, partial plate, eyeglasses or contact lenses before going to surgery.  Please bring a case for these items.  -Body piercings need to be removed.  Jewelry and valuables should be left at home.  -Put on loose,  comfortable, clean clothing, that will accommodate bandages    HOME PREOPERATIVE ANTIBACTERIAL SHOWER:  -This shower is a way of cleaning the skin with germ killing solution before surgery.  The solution contains Chorhexidine (CHG).   Let your Dr. Know if you are allergic to Chlorhexidine.    NIGHT BEFORE SURGERY:  -Take a normal shower and wash your hair  -Turn OFF water to avoid rinsing the antibacterial skin cleanser off (CHG).  -Apply the CHG cleanser to a clean and wet washcloth.  Lather your entire body from the neck down.    -DO NOT USE ON THE HEAD, FACE, or GENITALS.  -RINSE immediately if CHG is in contact with your eyes, ears or mouth  -Gently wash your body.  Have the CHG cleanser stay on your skin for 3 MINUTES.  -After 3 minutes, turn on water and rinse the CHG cleanser off your body completely  -DO NOT WASH with regular soap after using CHG.  -PAT DRY with a clean fresh towel  -DO NOT apply any raymundo, deodorants or lotions  -Dress in clean night cloths  **CHG cleanser will cause stains to fabrics if you wash them with bleach after use.     DAY OF SURGERY:  -Take shower-->JUST GET WET.  Turn OFF water.  -REPEAT the CHG cleanse as you did the night before.  -PAT DRY-->         NPO Instructions:    Do not eat any food after midnight the night before your surgery/procedure.    Additional Instructions:     Day of Surgery:  Wear  comfortable loose fitting clothing  Do not use moisturizers, creams, lotions or perfume  All jewelry and valuables should be left at home

## 2023-12-13 ENCOUNTER — APPOINTMENT (OUTPATIENT)
Dept: RADIOLOGY | Facility: HOSPITAL | Age: 63
End: 2023-12-13
Payer: COMMERCIAL

## 2023-12-13 ENCOUNTER — ANESTHESIA EVENT (OUTPATIENT)
Dept: OPERATING ROOM | Facility: HOSPITAL | Age: 63
End: 2023-12-13
Payer: COMMERCIAL

## 2023-12-13 ENCOUNTER — ANESTHESIA (OUTPATIENT)
Dept: OPERATING ROOM | Facility: HOSPITAL | Age: 63
End: 2023-12-13
Payer: COMMERCIAL

## 2023-12-13 ENCOUNTER — HOSPITAL ENCOUNTER (OUTPATIENT)
Facility: HOSPITAL | Age: 63
Setting detail: OUTPATIENT SURGERY
Discharge: HOME | End: 2023-12-13
Attending: ANESTHESIOLOGY | Admitting: ANESTHESIOLOGY
Payer: COMMERCIAL

## 2023-12-13 VITALS
TEMPERATURE: 97.2 F | RESPIRATION RATE: 16 BRPM | WEIGHT: 270.06 LBS | DIASTOLIC BLOOD PRESSURE: 63 MMHG | OXYGEN SATURATION: 100 % | SYSTOLIC BLOOD PRESSURE: 117 MMHG | BODY MASS INDEX: 43.4 KG/M2 | HEART RATE: 55 BPM | HEIGHT: 66 IN

## 2023-12-13 DIAGNOSIS — T85.192A MALFUNCTION OF SPINAL CORD STIMULATOR, INITIAL ENCOUNTER (CMS-HCC): Primary | ICD-10-CM

## 2023-12-13 PROCEDURE — 3600000003 HC OR TIME - INITIAL BASE CHARGE - PROCEDURE LEVEL THREE: Performed by: ANESTHESIOLOGY

## 2023-12-13 PROCEDURE — 2500000005 HC RX 250 GENERAL PHARMACY W/O HCPCS: Performed by: ANESTHESIOLOGY

## 2023-12-13 PROCEDURE — 3700000001 HC GENERAL ANESTHESIA TIME - INITIAL BASE CHARGE: Performed by: ANESTHESIOLOGY

## 2023-12-13 PROCEDURE — 3600000008 HC OR TIME - EACH INCREMENTAL 1 MINUTE - PROCEDURE LEVEL THREE: Performed by: ANESTHESIOLOGY

## 2023-12-13 PROCEDURE — 3700000002 HC GENERAL ANESTHESIA TIME - EACH INCREMENTAL 1 MINUTE: Performed by: ANESTHESIOLOGY

## 2023-12-13 PROCEDURE — A63688 PR REVISE/REMOVE SPINAL NEUROSTIM/RECEIVER: Performed by: NURSE ANESTHETIST, CERTIFIED REGISTERED

## 2023-12-13 PROCEDURE — 63688 REV/RMV IMP SP NPG/R DTCH CN: CPT | Performed by: ANESTHESIOLOGY

## 2023-12-13 PROCEDURE — 2720000007 HC OR 272 NO HCPCS: Performed by: ANESTHESIOLOGY

## 2023-12-13 PROCEDURE — A63688 PR REVISE/REMOVE SPINAL NEUROSTIM/RECEIVER: Performed by: ANESTHESIOLOGY

## 2023-12-13 PROCEDURE — 7100000009 HC PHASE TWO TIME - INITIAL BASE CHARGE: Performed by: ANESTHESIOLOGY

## 2023-12-13 PROCEDURE — 2500000004 HC RX 250 GENERAL PHARMACY W/ HCPCS (ALT 636 FOR OP/ED): Performed by: ANESTHESIOLOGY

## 2023-12-13 PROCEDURE — 2500000004 HC RX 250 GENERAL PHARMACY W/ HCPCS (ALT 636 FOR OP/ED): Performed by: NURSE ANESTHETIST, CERTIFIED REGISTERED

## 2023-12-13 PROCEDURE — 63661 REMOVE SPINE ELTRD PERQ ARAY: CPT | Performed by: ANESTHESIOLOGY

## 2023-12-13 PROCEDURE — A4217 STERILE WATER/SALINE, 500 ML: HCPCS | Performed by: ANESTHESIOLOGY

## 2023-12-13 PROCEDURE — 76000 FLUOROSCOPY <1 HR PHYS/QHP: CPT | Mod: 59

## 2023-12-13 PROCEDURE — 7100000010 HC PHASE TWO TIME - EACH INCREMENTAL 1 MINUTE: Performed by: ANESTHESIOLOGY

## 2023-12-13 PROCEDURE — 94760 N-INVAS EAR/PLS OXIMETRY 1: CPT | Mod: 59

## 2023-12-13 RX ORDER — PROPOFOL 10 MG/ML
INJECTION, EMULSION INTRAVENOUS AS NEEDED
Status: DISCONTINUED | OUTPATIENT
Start: 2023-12-13 | End: 2023-12-13

## 2023-12-13 RX ORDER — ACETAMINOPHEN 325 MG/1
650 TABLET ORAL EVERY 4 HOURS PRN
Status: CANCELLED | OUTPATIENT
Start: 2023-12-13

## 2023-12-13 RX ORDER — ONDANSETRON HYDROCHLORIDE 2 MG/ML
4 INJECTION, SOLUTION INTRAVENOUS ONCE AS NEEDED
Status: CANCELLED | OUTPATIENT
Start: 2023-12-13

## 2023-12-13 RX ORDER — SODIUM CHLORIDE 0.9 G/100ML
IRRIGANT IRRIGATION AS NEEDED
Status: DISCONTINUED | OUTPATIENT
Start: 2023-12-13 | End: 2023-12-13 | Stop reason: HOSPADM

## 2023-12-13 RX ORDER — LIDOCAINE HYDROCHLORIDE 10 MG/ML
0.1 INJECTION INFILTRATION; PERINEURAL ONCE
Status: CANCELLED | OUTPATIENT
Start: 2023-12-13 | End: 2023-12-13

## 2023-12-13 RX ORDER — ONDANSETRON HYDROCHLORIDE 2 MG/ML
INJECTION, SOLUTION INTRAVENOUS AS NEEDED
Status: DISCONTINUED | OUTPATIENT
Start: 2023-12-13 | End: 2023-12-13

## 2023-12-13 RX ORDER — FENTANYL CITRATE 50 UG/ML
INJECTION, SOLUTION INTRAMUSCULAR; INTRAVENOUS AS NEEDED
Status: DISCONTINUED | OUTPATIENT
Start: 2023-12-13 | End: 2023-12-13

## 2023-12-13 RX ORDER — KETOROLAC TROMETHAMINE 30 MG/ML
30 INJECTION, SOLUTION INTRAMUSCULAR; INTRAVENOUS ONCE AS NEEDED
Status: CANCELLED | OUTPATIENT
Start: 2023-12-13 | End: 2023-12-18

## 2023-12-13 RX ORDER — DIPHENHYDRAMINE HYDROCHLORIDE 50 MG/ML
12.5 INJECTION INTRAMUSCULAR; INTRAVENOUS ONCE AS NEEDED
Status: CANCELLED | OUTPATIENT
Start: 2023-12-13

## 2023-12-13 RX ORDER — FENTANYL CITRATE 50 UG/ML
INJECTION, SOLUTION INTRAMUSCULAR; INTRAVENOUS
Status: DISCONTINUED
Start: 2023-12-13 | End: 2023-12-13 | Stop reason: HOSPADM

## 2023-12-13 RX ORDER — DEXMEDETOMIDINE HYDROCHLORIDE 4 UG/ML
INJECTION, SOLUTION INTRAVENOUS CONTINUOUS PRN
Status: DISCONTINUED | OUTPATIENT
Start: 2023-12-13 | End: 2023-12-13

## 2023-12-13 RX ORDER — MIDAZOLAM HYDROCHLORIDE 1 MG/ML
INJECTION, SOLUTION INTRAMUSCULAR; INTRAVENOUS AS NEEDED
Status: DISCONTINUED | OUTPATIENT
Start: 2023-12-13 | End: 2023-12-13

## 2023-12-13 RX ORDER — IPRATROPIUM BROMIDE 0.5 MG/2.5ML
500 SOLUTION RESPIRATORY (INHALATION) ONCE
Status: CANCELLED | OUTPATIENT
Start: 2023-12-13 | End: 2023-12-13

## 2023-12-13 RX ORDER — DEXMEDETOMIDINE HYDROCHLORIDE 100 UG/ML
INJECTION, SOLUTION INTRAVENOUS AS NEEDED
Status: DISCONTINUED | OUTPATIENT
Start: 2023-12-13 | End: 2023-12-13

## 2023-12-13 RX ORDER — ALBUTEROL SULFATE 0.83 MG/ML
2.5 SOLUTION RESPIRATORY (INHALATION) ONCE AS NEEDED
Status: CANCELLED | OUTPATIENT
Start: 2023-12-13

## 2023-12-13 RX ORDER — MEPERIDINE HYDROCHLORIDE 25 MG/ML
12.5 INJECTION INTRAMUSCULAR; INTRAVENOUS; SUBCUTANEOUS EVERY 10 MIN PRN
Status: CANCELLED | OUTPATIENT
Start: 2023-12-13

## 2023-12-13 RX ORDER — SODIUM CHLORIDE, SODIUM LACTATE, POTASSIUM CHLORIDE, CALCIUM CHLORIDE 600; 310; 30; 20 MG/100ML; MG/100ML; MG/100ML; MG/100ML
100 INJECTION, SOLUTION INTRAVENOUS CONTINUOUS
Status: CANCELLED | OUTPATIENT
Start: 2023-12-13

## 2023-12-13 RX ORDER — SCOLOPAMINE TRANSDERMAL SYSTEM 1 MG/1
1 PATCH, EXTENDED RELEASE TRANSDERMAL ONCE
Status: CANCELLED | OUTPATIENT
Start: 2023-12-13 | End: 2023-12-13

## 2023-12-13 RX ADMIN — PROPOFOL 20 MG: 10 INJECTION, EMULSION INTRAVENOUS at 13:13

## 2023-12-13 RX ADMIN — PROPOFOL 30 MG: 10 INJECTION, EMULSION INTRAVENOUS at 13:46

## 2023-12-13 RX ADMIN — ONDANSETRON 4 MG: 2 INJECTION, SOLUTION INTRAMUSCULAR; INTRAVENOUS at 13:11

## 2023-12-13 RX ADMIN — DEXMEDETOMIDINE HYDROCHLORIDE 8 MCG: 100 INJECTION, SOLUTION, CONCENTRATE INTRAVENOUS at 13:05

## 2023-12-13 RX ADMIN — PROPOFOL 50 MG: 10 INJECTION, EMULSION INTRAVENOUS at 13:19

## 2023-12-13 RX ADMIN — PROPOFOL 50 MG: 10 INJECTION, EMULSION INTRAVENOUS at 13:37

## 2023-12-13 RX ADMIN — SODIUM CHLORIDE, POTASSIUM CHLORIDE, SODIUM LACTATE AND CALCIUM CHLORIDE: 600; 310; 30; 20 INJECTION, SOLUTION INTRAVENOUS at 13:03

## 2023-12-13 RX ADMIN — DEXMEDETOMIDINE HYDROCHLORIDE 8 MCG: 100 INJECTION, SOLUTION, CONCENTRATE INTRAVENOUS at 13:11

## 2023-12-13 RX ADMIN — FENTANYL CITRATE 100 MCG: 50 INJECTION, SOLUTION INTRAMUSCULAR; INTRAVENOUS at 13:05

## 2023-12-13 RX ADMIN — PROPOFOL 50 MG: 10 INJECTION, EMULSION INTRAVENOUS at 13:26

## 2023-12-13 RX ADMIN — DEXMEDETOMIDINE HYDROCHLORIDE 1 MCG/KG/HR: 400 INJECTION INTRAVENOUS at 13:07

## 2023-12-13 RX ADMIN — Medication 3 G: at 13:12

## 2023-12-13 RX ADMIN — MIDAZOLAM 2 MG: 1 INJECTION INTRAMUSCULAR; INTRAVENOUS at 13:05

## 2023-12-13 SDOH — HEALTH STABILITY: MENTAL HEALTH: CURRENT SMOKER: 1

## 2023-12-13 ASSESSMENT — PAIN - FUNCTIONAL ASSESSMENT
PAIN_FUNCTIONAL_ASSESSMENT: 0-10

## 2023-12-13 ASSESSMENT — PAIN SCALES - GENERAL
PAIN_LEVEL: 0
PAINLEVEL_OUTOF10: 0 - NO PAIN

## 2023-12-13 NOTE — ANESTHESIA POSTPROCEDURE EVALUATION
"Patient: Shila Botello \"Elinor\"    Procedure Summary       Date: 12/13/23 Room / Location: [facility] OR 04 / Virtual [facility] OR    Anesthesia Start: 1303 Anesthesia Stop: 1359    Procedure: SPINAL CORD STIMULATOR REMOVAL WITH C-ARM (Bilateral: Back) Diagnosis:       Malfunction of spinal cord stimulator, initial encounter (CMS/[facility])      (Malfunction of spinal cord stimulator, initial encounter (CMS/[facility]) [T85.192A])    Surgeons: Kristin Ivy MD Responsible Provider: Jaiden Roe MD    Anesthesia Type: MAC ASA Status: 3            Anesthesia Type: MAC    Vitals Value Taken Time   BP 90/49 12/13/23 1359   Temp 36.2 12/13/23 1359   Pulse 58 12/13/23 1359   Resp 14 12/13/23 1359   SpO2 96 12/13/23 1359       Anesthesia Post Evaluation    Patient location during evaluation: PACU  Patient participation: complete - patient participated  Level of consciousness: awake and alert  Pain score: 0  Pain management: adequate  Multimodal analgesia pain management approach  Airway patency: patent  Cardiovascular status: acceptable  Respiratory status: acceptable  Hydration status: acceptable  Postoperative Nausea and Vomiting: none        No notable events documented.    "

## 2023-12-13 NOTE — OP NOTE
"SPINAL CORD STIMULATOR REMOVAL WITH C-ARM (B) Operative Note     Date: 2023  OR Location: [facility] OR    Name: Shila Botello \"Elinor\", : 1960, Age: 62 y.o., MRN: [id number], Sex: female    Diagnosis  Pre-op Diagnosis     * Malfunction of spinal cord stimulator, initial encounter (CMS/[facility]) [T85.192A] Post-op Diagnosis     * Malfunction of spinal cord stimulator, initial encounter (CMS/[facility]) [T85.192A]     Procedures  SPINAL CORD STIMULATOR REMOVAL WITH C-ARM  69451 - NE RMVL SPINAL NSTIM ELTRD PLATE/PADDLE INCL FLUOR    SPINAL CORD STIMULATOR REMOVAL WITH C-ARM  87008 - NE REVJ/RMVL IMPLANTED SPINAL NEUROSTIM GENERATOR      Surgeons      * Kristin Ivy - Primary    Resident/Fellow/Other Assistant:  Surgeon(s) and Role:    Procedure Summary  Anesthesia: Monitor Anesthesia Care  ASA: III  Anesthesia Staff: Anesthesiologist: Jaiden Roe MD  CRNA: SAGAR Mendoza-CRNA  Estimated Blood Loss: 3mL  Intra-op Medications:   Medication Name Total Dose   lidocaine-epinephrine PF (Xylocaine W/EPI) 1 %-1:200,000 injection 27 mL   sodium chloride 0.9 % irrigation solution 100 mL   ceFAZolin (Ancef) in dextrose 5% (ADV/MBP) IV 3 g 3 g              Anesthesia Record               Intraprocedure I/O Totals          Intake    Dexmedetomidine 0.00 mL    The total shown is the total volume documented since Anesthesia Start was filed.    Total Intake 0 mL          Specimen: No specimens collected     Staff:   Circulator: Divine Beard RN  Scrub Person: Sudarshan Gamble RN         Drains and/or Catheters: * None in log *    Tourniquet Times:         Implants:     Findings: Both leads and IPG were removed intact    Indications: Elinor Botello is an 62 y.o. female who is having surgery for Malfunction of spinal cord stimulator, initial encounter (CMS/[facility]) [T85.192A].     The patient was seen in the preoperative area. The risks, benefits, complications, treatment options, non-operative alternatives, expected recovery " and outcomes were discussed with the patient. The possibilities of reaction to medication, pulmonary aspiration, injury to surrounding structures, bleeding, recurrent infection, the need for additional procedures, failure to diagnose a condition, and creating a complication requiring transfusion or operation were discussed with the patient. The patient concurred with the proposed plan, giving informed consent.  The site of surgery was properly noted/marked if necessary per policy. The patient has been actively warmed in preoperative area. Preoperative antibiotics have been ordered and given within 1 hours of incision. Venous thrombosis prophylaxis have been ordered including bilateral sequential compression devices    Procedure Details: Removal of spinal cord stimulator leads and IPG  Complications:  None; patient tolerated the procedure well.    Disposition: PACU - hemodynamically stable.  Condition: stable         Additional Details:  Pre-op Antibiotics: Ancef 2 g     CLINICAL FINDINGS: This is a patient with the pain management clinic  who had a history of significant lower back pain and lower extremity pain and  intractable pain. Patient has had a [employer] spinal cord stimulator implanted. The stimulator was working properly, then  eventually was not covering the pain.  Despite multiple stimulation and patient does not feel that even the stimulation is helping her pain anymore,  and patient decided to proceed with removal of the spinal cord stimulator so patient can have an MRI whenever she needs.           DESCRIPTION OF PROCEDURE: After sufficient consent was signed, the  patient was brought to the operating room and placed in the prone  position with a pillow underneath the hips.  Anesthesia was started.  Both midline incision and the pocket incision were infiltrated with  lidocaine 1% with epi .  Then both incisions were re-opened  and dissection was carried down to the deep fascia where in the  generator pocket was identified, the stay sutures were removed and  the generator was removed.  Then from the midline incision, the anchors were identified and both leads and their anchors were removed.  Then both incisions were irrigated with antibiotic solution.  The deep  tissue was closed using 2-0 Vicryl continuous sutures, and the  subcutaneous tissue was closed using 3-0 Vicryl subcutaneous, and then  subcuticular 4-0 Monocryl was used to close both incisions. Dermabond  was placed on both, and the patient was discharged to the recovery room  in stable condition.            The patient is going  to get a little bit of pain medication for postoperative pain relief,  and is to followup in 2 weeks with the pain management clinic.         Attending Attestation: I performed the procedure.    Kristin Ivy  Phone Number: [phone]

## 2023-12-13 NOTE — ANESTHESIA PREPROCEDURE EVALUATION
"Patient: Shila Botello \"Elinor\"    Procedure Information       Date/Time: 12/13/23 1300    Procedure: SPINAL CORD STIMULATOR REMOVAL WITH C-ARM (Bilateral)    Location: [facility] OR 04 / Virtual [facility] OR    Surgeons: Kristin Ivy MD            Relevant Problems   Cardiovascular   (+) CAD (coronary artery disease)   (+) HTN (hypertension)   (+) Hyperlipemia      Endocrine   (+) Class 3 severe obesity with serious comorbidity and body mass index (BMI) of 40.0 to 44.9 in adult (CMS/HCC)      GI   (+) PUD (peptic ulcer disease)      Neuro/Psych   (+) Anxiety   (+) Lumbar radiculopathy      Pulmonary   (+) Asthma   (+) COPD (chronic obstructive pulmonary disease) (CMS/[facility])   (+) WHIT (obstructive sleep apnea)      Hematology   (+) Polycythemia   (+) Polycythemia vera (CMS/HCC)   (+) Thrombocytopenia (CMS/HCC)      Musculoskeletal   (+) Lumbar spondylosis      Infectious Disease   (+) Cutaneous wart   (+) Warts      Other   (+) Polycythemia vera (CMS/[facility])       Clinical information reviewed:                   NPO Detail:  No data recorded     Physical Exam    Airway  Mallampati: IV  TM distance: <3 FB  Neck ROM: full     Cardiovascular - normal exam  Rhythm: regular  Rate: normal     Dental - normal exam     Pulmonary - normal exam     Abdominal   (+) obese             Anesthesia Plan    ASA 3     MAC     The patient is a current smoker.  Education provided regarding risk of obstructive sleep apnea.  intravenous induction   Postoperative administration of opioids is intended.  Trial extubation is planned.  Anesthetic plan and risks discussed with patient.  Use of blood products discussed with patient who consented to blood products.    Plan discussed with CRNA.      "

## 2023-12-21 DIAGNOSIS — M47.816 LUMBAR SPONDYLOSIS: Primary | ICD-10-CM

## 2023-12-26 DIAGNOSIS — F41.9 ANXIETY: ICD-10-CM

## 2023-12-27 RX ORDER — DULOXETIN HYDROCHLORIDE 60 MG/1
60 CAPSULE, DELAYED RELEASE ORAL DAILY
Qty: 90 CAPSULE | Refills: 0 | Status: SHIPPED | OUTPATIENT
Start: 2023-12-27 | End: 2024-01-14

## 2024-01-03 RX ORDER — NABUMETONE 750 MG/1
750 TABLET, FILM COATED ORAL EVERY 12 HOURS
Qty: 180 TABLET | Refills: 0 | Status: SHIPPED | OUTPATIENT
Start: 2024-01-03 | End: 2024-02-16 | Stop reason: SDUPTHER

## 2024-01-09 DIAGNOSIS — F41.9 ANXIETY: ICD-10-CM

## 2024-01-12 ENCOUNTER — OFFICE VISIT (OUTPATIENT)
Dept: PAIN MEDICINE | Facility: CLINIC | Age: 64
End: 2024-01-12
Payer: COMMERCIAL

## 2024-01-12 VITALS
SYSTOLIC BLOOD PRESSURE: 143 MMHG | WEIGHT: 269 LBS | DIASTOLIC BLOOD PRESSURE: 74 MMHG | OXYGEN SATURATION: 95 % | HEIGHT: 66 IN | RESPIRATION RATE: 16 BRPM | BODY MASS INDEX: 43.23 KG/M2 | TEMPERATURE: 97.3 F | HEART RATE: 67 BPM

## 2024-01-12 DIAGNOSIS — G47.33 OSA (OBSTRUCTIVE SLEEP APNEA): ICD-10-CM

## 2024-01-12 DIAGNOSIS — M43.06 LUMBAR SPONDYLOLYSIS: Primary | ICD-10-CM

## 2024-01-12 DIAGNOSIS — M79.7 FIBROMYALGIA: ICD-10-CM

## 2024-01-12 DIAGNOSIS — E66.01 MORBID OBESITY WITH BMI OF 40.0-44.9, ADULT (MULTI): ICD-10-CM

## 2024-01-12 PROCEDURE — 3078F DIAST BP <80 MM HG: CPT | Performed by: ANESTHESIOLOGY

## 2024-01-12 PROCEDURE — 99214 OFFICE O/P EST MOD 30 MIN: CPT | Performed by: ANESTHESIOLOGY

## 2024-01-12 PROCEDURE — 3077F SYST BP >= 140 MM HG: CPT | Performed by: ANESTHESIOLOGY

## 2024-01-12 PROCEDURE — 3008F BODY MASS INDEX DOCD: CPT | Performed by: ANESTHESIOLOGY

## 2024-01-12 SDOH — ECONOMIC STABILITY: FOOD INSECURITY: WITHIN THE PAST 12 MONTHS, THE FOOD YOU BOUGHT JUST DIDN'T LAST AND YOU DIDN'T HAVE MONEY TO GET MORE.: NEVER TRUE

## 2024-01-12 SDOH — ECONOMIC STABILITY: HOUSING INSECURITY
IN THE LAST 12 MONTHS, WAS THERE A TIME WHEN YOU DID NOT HAVE A STEADY PLACE TO SLEEP OR SLEPT IN A SHELTER (INCLUDING NOW)?: NO

## 2024-01-12 SDOH — ECONOMIC STABILITY: INCOME INSECURITY: IN THE LAST 12 MONTHS, WAS THERE A TIME WHEN YOU WERE NOT ABLE TO PAY THE MORTGAGE OR RENT ON TIME?: NO

## 2024-01-12 SDOH — ECONOMIC STABILITY: FOOD INSECURITY: WITHIN THE PAST 12 MONTHS, YOU WORRIED THAT YOUR FOOD WOULD RUN OUT BEFORE YOU GOT MONEY TO BUY MORE.: NEVER TRUE

## 2024-01-12 ASSESSMENT — ENCOUNTER SYMPTOMS
OCCASIONAL FEELINGS OF UNSTEADINESS: 0
LOSS OF SENSATION IN FEET: 0
DEPRESSION: 0

## 2024-01-12 ASSESSMENT — LIFESTYLE VARIABLES: TOTAL SCORE: 0

## 2024-01-12 ASSESSMENT — PAIN SCALES - GENERAL: PAINLEVEL: 0-NO PAIN

## 2024-01-12 ASSESSMENT — PATIENT HEALTH QUESTIONNAIRE - PHQ9
1. LITTLE INTEREST OR PLEASURE IN DOING THINGS: NOT AT ALL
SUM OF ALL RESPONSES TO PHQ9 QUESTIONS 1 AND 2: 0
2. FEELING DOWN, DEPRESSED OR HOPELESS: NOT AT ALL

## 2024-01-12 NOTE — PROGRESS NOTES
SUBJECTIVE:  This is 63 y.o.  female with PMH of Self referral previous patient of [employer] who had spinal cord stimulator implanted years ago. this is 62 y.o.  female Not accompanied by another personwith a past history of anxiety, morbidly obese BMI 43, smoker, COPD, polycythemia,,  who is here for follow-up after the removal of spinal cord stimulator on 12/13/2023: Who is doing very well after the removal.  All incisions are healed nicely no collection and no redness.  The patient informed that she does not need to follow-up with the pain management clinic except on a as needed basis and if she needs any injection we will be happy to provide her with that.      Prior office visit:  10/27/2023: The patient was referred to us by Referring Provider: Self referral previous patient of [employer] who had spinal cord stimulator implanted years ago. this is 62 y.o.  female Not accompanied by another personwith a past history of anxiety, morbidly obese BMI 43, smoker, COPD, polycythemia, presenting with nonfunctioning spinal cord stimulator with depleted battery with a desire to take it out.  The patient stated that her pain is not really that bad anymore and the spinal cord stimulator is been off for almost more than couple of years and she would like it out.  We discussed revisiting the system with the newer [employer]fin[employer] leads but the patient adamant that she does not have really any major problem would like it out.  We will proceed with scheduling her for removal of spinal cord stimulator.     Prior Pain Therapies: Spinal cord stimulator [employer] 2012 implant, revision of an IPG with the Spectra generator in 2018     Past surgical history: None pertaining to her musculoskeletal beside the spinal cord stimulator implant        Procedures:   12/13/2023 removal spinal cord stimulator  Multiple injections in the past followed by spinal cord stimulator implant in 2012 and revised in 2018     Portions of record  reviewed for pertinent issues: active problem list, medication list, allergies, family history, social history, notes from last encounter, encounters, lab results, imaging and other available records.     I have personally reviewed the OARRS report for this patient. This report is scanned into the electronic medical record. I have considered the risks of abuse, dependence, addiction and diversion. It showed: Few Percocet from PCP in 2023 and naltrexone trial on 7/11/2023  OPIOID RISK ASSESSMENT SCORE 1/26  Aberrant behavior: None     Work Hx/litigation:  Patient currently works at [email or web address] full-time dayshift 28 hours a week  Social history: , Has 3children, 9grandchildren, finished high school, denies smoking drinking or use of illicit drugs       Review of Systems   HENT: Negative.     Eyes: Negative.    Respiratory: Negative.     Cardiovascular: Negative.    Gastrointestinal: Negative.    Endocrine: Negative.    Genitourinary: Negative.    Musculoskeletal:  Positive for back pain.   Skin: Negative.    Neurological: Negative.    Hematological: Negative.    Psychiatric/Behavioral: Negative.          Physical Exam  Vitals and nursing note reviewed.   Constitutional:       Appearance: Normal appearance.   HENT:      Head: Normocephalic and atraumatic.      Nose: Nose normal.   Eyes:      Extraocular Movements: Extraocular movements intact.      Conjunctiva/sclera: Conjunctivae normal.      Pupils: Pupils are equal, round, and reactive to light.   Cardiovascular:      Rate and Rhythm: Normal rate and regular rhythm.      Pulses: Normal pulses.      Heart sounds: Normal heart sounds.   Pulmonary:      Effort: Pulmonary effort is normal.      Breath sounds: Normal breath sounds.   Abdominal:      General: Abdomen is flat. Bowel sounds are normal.      Palpations: Abdomen is soft.   Musculoskeletal:         General: Tenderness present.   Skin:     General: Skin is warm.   Neurological:      General: No focal  deficit present.      Mental Status: She is alert and oriented to person, place, and time.   Psychiatric:         Mood and Affect: Mood normal.         Behavior: Behavior normal.                      Plan  At least 50% of the visit was involved in the discussion of the options for treatment. We discussed exercises, medication, interventional therapies and surgery. Healthy life style is essential with patient hard work to achieve the wellness. In addition; discussion with the patient and/or family about any of the diagnostic results, impressions and/or recommended diagnostic studies, prognosis, risks and benefits of treatment options, instructions for treatment and/or follow-up, importance of compliance with chosen treatment options, risk-factor reduction, and patient/family education.         Pool therapy, walking in the pool, at least 3x per week for 30 minutes  Continue self-directed physical therapy  Healthy lifestyle and anti-inflammatory diet in addition to weight control discussed with the patient  Alternative chronic pain therapies was discussed, encouraged and information was handed  Return to Clinic as needed     *Please note this report has been produced using speech recognition software and may contain errors related to that system including grammar, punctuation and spelling as well as words and phrases that may be inappropriate. If there are questions or concerns, please feel free to contact me to clarify.    Kristin Ivy MD

## 2024-01-12 NOTE — PROGRESS NOTES
This is 63 y.o.  female with who has been treated for  no pain today . Pain is better, The pain is described as  no pain  and is relieved by Standing, Sitting, Lying down, Walking, and Medications oxy  with who is here for follow-up No chief complaint on file.      Pain Therapies:  took out tens unit    Opioid Risk Assessment Score 0/26

## 2024-01-14 RX ORDER — DULOXETIN HYDROCHLORIDE 60 MG/1
60 CAPSULE, DELAYED RELEASE ORAL DAILY
Qty: 30 CAPSULE | Refills: 0 | Status: SHIPPED | OUTPATIENT
Start: 2024-01-14 | End: 2024-02-16 | Stop reason: SDUPTHER

## 2024-01-15 ASSESSMENT — ENCOUNTER SYMPTOMS
NEUROLOGICAL NEGATIVE: 1
RESPIRATORY NEGATIVE: 1
ENDOCRINE NEGATIVE: 1
GASTROINTESTINAL NEGATIVE: 1
PSYCHIATRIC NEGATIVE: 1
CARDIOVASCULAR NEGATIVE: 1
HEMATOLOGIC/LYMPHATIC NEGATIVE: 1
EYES NEGATIVE: 1
BACK PAIN: 1

## 2024-01-25 ENCOUNTER — TELEMEDICINE (OUTPATIENT)
Dept: PRIMARY CARE | Facility: CLINIC | Age: 64
End: 2024-01-25
Payer: COMMERCIAL

## 2024-01-25 DIAGNOSIS — E66.01 MORBID OBESITY WITH BMI OF 40.0-44.9, ADULT (MULTI): ICD-10-CM

## 2024-01-25 DIAGNOSIS — J44.9 CHRONIC OBSTRUCTIVE PULMONARY DISEASE, UNSPECIFIED COPD TYPE (MULTI): ICD-10-CM

## 2024-01-25 DIAGNOSIS — J20.9 ACUTE BRONCHITIS, UNSPECIFIED ORGANISM: Primary | ICD-10-CM

## 2024-01-25 PROCEDURE — 99441 PR PHYS/QHP TELEPHONE EVALUATION 5-10 MIN: CPT | Performed by: INTERNAL MEDICINE

## 2024-01-25 RX ORDER — AZITHROMYCIN 250 MG/1
TABLET, FILM COATED ORAL
Qty: 6 TABLET | Refills: 0 | Status: SHIPPED | OUTPATIENT
Start: 2024-01-25 | End: 2024-01-30

## 2024-01-25 RX ORDER — BENZONATATE 100 MG/1
100 CAPSULE ORAL 3 TIMES DAILY PRN
Qty: 30 CAPSULE | Refills: 0 | Status: SHIPPED | OUTPATIENT
Start: 2024-01-25 | End: 2024-02-04

## 2024-01-25 RX ORDER — ATORVASTATIN CALCIUM 80 MG/1
40 TABLET, FILM COATED ORAL
COMMUNITY
Start: 2023-02-06 | End: 2024-01-25 | Stop reason: WASHOUT

## 2024-01-25 RX ORDER — PREDNISONE 50 MG/1
50 TABLET ORAL DAILY
Qty: 5 TABLET | Refills: 0 | Status: SHIPPED | OUTPATIENT
Start: 2024-01-25 | End: 2024-01-30

## 2024-01-25 ASSESSMENT — ENCOUNTER SYMPTOMS
CHILLS: 1
SHORTNESS OF BREATH: 0
RHINORRHEA: 1
FEVER: 0
COUGH: 1
NAUSEA: 0
OCCASIONAL FEELINGS OF UNSTEADINESS: 0
SORE THROAT: 1
LOSS OF SENSATION IN FEET: 0
DEPRESSION: 0
VOMITING: 0
ABDOMINAL PAIN: 0

## 2024-01-25 NOTE — PROGRESS NOTES
Subjective   Patient ID: Elinor Botello is a 63 y.o. female who presents for sinus congestion (X 10 days hoarse, chest and head congestion).    HPI Patient thought she had the flu 2 weeks ago.  She tried OTC sinus pills for congestion for this. She went back to work last week and she is still having congestion, cough, hoarseness. She is taking Nyquil, Dayquil. Denies fever, but has chills. Denies CP, SOB.  She is coughing up clear phelgm.  She is having clear sinus drainage. She has a possible history of COPD per the patient. She is a smoker. She is using an albuterol inhaler.     Review of Systems   Constitutional:  Positive for chills. Negative for fever.   HENT:  Positive for congestion, postnasal drip, rhinorrhea and sore throat. Negative for ear pain.    Respiratory:  Positive for cough. Negative for shortness of breath.    Cardiovascular:  Negative for chest pain and leg swelling.   Gastrointestinal:  Negative for abdominal pain, nausea and vomiting.   Skin:  Negative for rash.       Objective   There were no vitals taken for this visit.    Physical Exam    Assessment/Plan   Problem List Items Addressed This Visit             ICD-10-CM    COPD (chronic obstructive pulmonary disease) (CMS/formerly [facility]) J44.9    Morbid obesity with BMI of 40.0-44.9, adult (CMS/formerly [facility]) E66.01, Z68.41    Acute bronchitis - Primary J20.9    Relevant Medications    azithromycin (Zithromax) 250 mg tablet    predniSONE (Deltasone) 50 mg tablet    benzonatate (Tessalon) 100 mg capsule     Acute bronchitis with COPD: Patient with presumed COPD.  Did not have any fever but has had symptoms now greater than 10 days.  Will be treated with azithromycin, prednisone and benzonatate she already has albuterol inhaler.  Follow-up in office if symptoms or not improve or worsening.  Denies any chest pain or shortness of breath.    Phone visit of 8 minutes duration

## 2024-02-16 ENCOUNTER — OFFICE VISIT (OUTPATIENT)
Dept: PRIMARY CARE | Facility: CLINIC | Age: 64
End: 2024-02-16
Payer: COMMERCIAL

## 2024-02-16 VITALS
BODY MASS INDEX: 42.27 KG/M2 | DIASTOLIC BLOOD PRESSURE: 86 MMHG | WEIGHT: 263 LBS | HEART RATE: 69 BPM | SYSTOLIC BLOOD PRESSURE: 148 MMHG | HEIGHT: 66 IN

## 2024-02-16 DIAGNOSIS — Z12.31 ENCOUNTER FOR SCREENING MAMMOGRAM FOR BREAST CANCER: ICD-10-CM

## 2024-02-16 DIAGNOSIS — M47.816 LUMBAR SPONDYLOSIS: ICD-10-CM

## 2024-02-16 DIAGNOSIS — Z23 ENCOUNTER FOR IMMUNIZATION: ICD-10-CM

## 2024-02-16 DIAGNOSIS — R73.03 PREDIABETES: ICD-10-CM

## 2024-02-16 DIAGNOSIS — K21.9 GASTROESOPHAGEAL REFLUX DISEASE WITHOUT ESOPHAGITIS: ICD-10-CM

## 2024-02-16 DIAGNOSIS — F41.9 ANXIETY: ICD-10-CM

## 2024-02-16 DIAGNOSIS — D45 POLYCYTHEMIA VERA (MULTI): ICD-10-CM

## 2024-02-16 DIAGNOSIS — Z13.29 THYROID DISORDER SCREENING: ICD-10-CM

## 2024-02-16 DIAGNOSIS — I10 PRIMARY HYPERTENSION: Primary | ICD-10-CM

## 2024-02-16 DIAGNOSIS — E55.9 VITAMIN D DEFICIENCY: ICD-10-CM

## 2024-02-16 DIAGNOSIS — E78.2 MIXED HYPERLIPIDEMIA: ICD-10-CM

## 2024-02-16 DIAGNOSIS — G47.33 OSA (OBSTRUCTIVE SLEEP APNEA): ICD-10-CM

## 2024-02-16 PROCEDURE — 90750 HZV VACC RECOMBINANT IM: CPT | Performed by: INTERNAL MEDICINE

## 2024-02-16 PROCEDURE — 3079F DIAST BP 80-89 MM HG: CPT | Performed by: INTERNAL MEDICINE

## 2024-02-16 PROCEDURE — 99214 OFFICE O/P EST MOD 30 MIN: CPT | Performed by: INTERNAL MEDICINE

## 2024-02-16 PROCEDURE — 90471 IMMUNIZATION ADMIN: CPT | Performed by: INTERNAL MEDICINE

## 2024-02-16 PROCEDURE — 4004F PT TOBACCO SCREEN RCVD TLK: CPT | Performed by: INTERNAL MEDICINE

## 2024-02-16 PROCEDURE — 3077F SYST BP >= 140 MM HG: CPT | Performed by: INTERNAL MEDICINE

## 2024-02-16 PROCEDURE — 3008F BODY MASS INDEX DOCD: CPT | Performed by: INTERNAL MEDICINE

## 2024-02-16 RX ORDER — NABUMETONE 750 MG/1
750 TABLET, FILM COATED ORAL EVERY 12 HOURS
Qty: 180 TABLET | Refills: 1 | Status: SHIPPED | OUTPATIENT
Start: 2024-02-16 | End: 2024-08-14

## 2024-02-16 RX ORDER — TELMISARTAN 20 MG/1
20 TABLET ORAL DAILY
Qty: 90 TABLET | Refills: 1 | Status: SHIPPED | OUTPATIENT
Start: 2024-02-16 | End: 2024-08-14

## 2024-02-16 RX ORDER — PANTOPRAZOLE SODIUM 40 MG/1
40 TABLET, DELAYED RELEASE ORAL DAILY
Qty: 90 TABLET | Refills: 1 | Status: SHIPPED | OUTPATIENT
Start: 2024-02-16 | End: 2024-08-14

## 2024-02-16 RX ORDER — DULOXETIN HYDROCHLORIDE 60 MG/1
60 CAPSULE, DELAYED RELEASE ORAL DAILY
Qty: 90 CAPSULE | Refills: 1 | Status: SHIPPED | OUTPATIENT
Start: 2024-02-16 | End: 2024-08-14

## 2024-02-16 ASSESSMENT — PATIENT HEALTH QUESTIONNAIRE - PHQ9
1. LITTLE INTEREST OR PLEASURE IN DOING THINGS: NOT AT ALL
2. FEELING DOWN, DEPRESSED OR HOPELESS: NOT AT ALL
SUM OF ALL RESPONSES TO PHQ9 QUESTIONS 1 AND 2: 0

## 2024-02-16 NOTE — PROGRESS NOTES
"Subjective   Patient ID: Elinor Botello is a 63 y.o. female who presents for Follow-up (Meds former dr ballesteros patient).    HPI  Patient thinks she stopped atorvastatin due to itching.     Review of Systems    Objective   /86   Pulse 69   Ht 1.676 m (5' 6\")   Wt 119 kg (263 lb)   BMI 42.45 kg/m²     Physical Exam    Assessment/Plan   Problem List Items Addressed This Visit             ICD-10-CM    Anxiety F41.9    Relevant Medications    DULoxetine (Cymbalta) 60 mg DR capsule    HTN (hypertension) I10    Relevant Medications    telmisartan (MIcarDIS) 20 mg tablet    Other Relevant Orders    CBC and Auto Differential    Comprehensive metabolic panel    Hyperlipemia - Primary E78.5    Relevant Orders    CBC and Auto Differential    Comprehensive metabolic panel    Lipid panel    WHIT (obstructive sleep apnea) G47.33    Relevant Orders    Home sleep apnea test (HSAT)    Polycythemia vera (CMS/HCC) D45    Relevant Orders    Home sleep apnea test (HSAT)    Vitamin D deficiency E55.9    Relevant Orders    Vitamin D 25-Hydroxy,Total (for eval of Vitamin D levels)    Lumbar spondylosis M47.816    Relevant Medications    nabumetone (Relafen) 750 mg tablet    Encounter for screening mammogram for breast cancer Z12.31    Relevant Orders    BI mammo bilateral screening tomosynthesis    Gastroesophageal reflux disease without esophagitis K21.9    Relevant Medications    pantoprazole (ProtoNix) 40 mg EC tablet    Encounter for immunization Z23    Relevant Orders    Zoster vaccine, recombinant, adult (SHINGRIX) (Completed)    Thyroid disorder screening Z13.29    Relevant Orders    TSH with reflex to Free T4 if abnormal    Prediabetes R73.03    Relevant Orders    Hemoglobin A1C          "

## 2024-03-15 PROBLEM — Z12.31 ENCOUNTER FOR SCREENING MAMMOGRAM FOR BREAST CANCER: Status: ACTIVE | Noted: 2024-03-15

## 2024-03-15 PROBLEM — Z23 ENCOUNTER FOR IMMUNIZATION: Status: ACTIVE | Noted: 2024-03-15

## 2024-03-15 PROBLEM — K21.9 GASTROESOPHAGEAL REFLUX DISEASE WITHOUT ESOPHAGITIS: Status: ACTIVE | Noted: 2024-03-15

## 2024-03-15 PROBLEM — Z13.29 THYROID DISORDER SCREENING: Status: ACTIVE | Noted: 2024-03-15

## 2024-03-15 PROBLEM — R73.03 PREDIABETES: Status: ACTIVE | Noted: 2024-03-15

## 2024-03-16 ENCOUNTER — CLINICAL SUPPORT (OUTPATIENT)
Dept: SLEEP MEDICINE | Facility: HOSPITAL | Age: 64
End: 2024-03-16
Payer: COMMERCIAL

## 2024-03-16 DIAGNOSIS — G47.33 OSA (OBSTRUCTIVE SLEEP APNEA): ICD-10-CM

## 2024-03-16 DIAGNOSIS — D45 POLYCYTHEMIA VERA (MULTI): ICD-10-CM

## 2024-03-16 PROCEDURE — 95806 SLEEP STUDY UNATT&RESP EFFT: CPT | Performed by: PSYCHIATRY & NEUROLOGY

## 2024-03-24 DIAGNOSIS — G47.34 SLEEP RELATED HYPOXIA: ICD-10-CM

## 2024-03-24 DIAGNOSIS — G47.33 OSA (OBSTRUCTIVE SLEEP APNEA): Primary | ICD-10-CM

## 2024-03-25 NOTE — RESULT ENCOUNTER NOTE
Sleep study showed moderate Obstructive sleep apnea and low oxygen with sleep.  Patient has been referred to sleep medicine for further evaluation and treatment. See referral.

## 2024-07-20 DIAGNOSIS — J44.9 CHRONIC OBSTRUCTIVE PULMONARY DISEASE, UNSPECIFIED COPD TYPE (MULTI): Primary | ICD-10-CM

## 2024-07-24 RX ORDER — ALBUTEROL SULFATE 90 UG/1
2 AEROSOL, METERED RESPIRATORY (INHALATION) EVERY 4 HOURS PRN
Qty: 54 G | Refills: 0 | Status: SHIPPED | OUTPATIENT
Start: 2024-07-24

## 2024-08-14 ENCOUNTER — APPOINTMENT (OUTPATIENT)
Dept: RADIOLOGY | Facility: HOSPITAL | Age: 64
End: 2024-08-14
Payer: COMMERCIAL

## 2024-08-14 ENCOUNTER — OFFICE VISIT (OUTPATIENT)
Dept: ORTHOPEDIC SURGERY | Facility: CLINIC | Age: 64
End: 2024-08-14
Payer: COMMERCIAL

## 2024-08-14 ENCOUNTER — HOSPITAL ENCOUNTER (EMERGENCY)
Facility: HOSPITAL | Age: 64
Discharge: HOME | End: 2024-08-14
Attending: STUDENT IN AN ORGANIZED HEALTH CARE EDUCATION/TRAINING PROGRAM
Payer: COMMERCIAL

## 2024-08-14 VITALS
WEIGHT: 268 LBS | OXYGEN SATURATION: 96 % | HEART RATE: 65 BPM | SYSTOLIC BLOOD PRESSURE: 173 MMHG | DIASTOLIC BLOOD PRESSURE: 85 MMHG | HEIGHT: 66 IN | RESPIRATION RATE: 19 BRPM | TEMPERATURE: 97.2 F | BODY MASS INDEX: 43.07 KG/M2

## 2024-08-14 VITALS — HEIGHT: 66 IN | WEIGHT: 268 LBS | BODY MASS INDEX: 43.07 KG/M2

## 2024-08-14 DIAGNOSIS — S60.212A CONTUSION OF LEFT WRIST, INITIAL ENCOUNTER: Primary | ICD-10-CM

## 2024-08-14 DIAGNOSIS — M25.532 LEFT WRIST PAIN: Primary | ICD-10-CM

## 2024-08-14 DIAGNOSIS — S63.502A WRIST SPRAIN, LEFT, INITIAL ENCOUNTER: ICD-10-CM

## 2024-08-14 PROCEDURE — 73110 X-RAY EXAM OF WRIST: CPT | Mod: LEFT SIDE

## 2024-08-14 PROCEDURE — 4004F PT TOBACCO SCREEN RCVD TLK: CPT | Performed by: INTERNAL MEDICINE

## 2024-08-14 PROCEDURE — 73130 X-RAY EXAM OF HAND: CPT | Mod: LEFT SIDE

## 2024-08-14 PROCEDURE — 99203 OFFICE O/P NEW LOW 30 MIN: CPT | Performed by: INTERNAL MEDICINE

## 2024-08-14 PROCEDURE — 99213 OFFICE O/P EST LOW 20 MIN: CPT | Performed by: INTERNAL MEDICINE

## 2024-08-14 PROCEDURE — 99284 EMERGENCY DEPT VISIT MOD MDM: CPT | Performed by: STUDENT IN AN ORGANIZED HEALTH CARE EDUCATION/TRAINING PROGRAM

## 2024-08-14 PROCEDURE — 3008F BODY MASS INDEX DOCD: CPT | Performed by: INTERNAL MEDICINE

## 2024-08-14 PROCEDURE — L3809 WHFO W/O JOINTS PRE OTS: HCPCS | Performed by: INTERNAL MEDICINE

## 2024-08-14 PROCEDURE — 73130 X-RAY EXAM OF HAND: CPT | Mod: LT

## 2024-08-14 PROCEDURE — 99284 EMERGENCY DEPT VISIT MOD MDM: CPT

## 2024-08-14 PROCEDURE — 73110 X-RAY EXAM OF WRIST: CPT | Mod: LT

## 2024-08-14 ASSESSMENT — PAIN DESCRIPTION - LOCATION: LOCATION: HAND

## 2024-08-14 ASSESSMENT — PAIN - FUNCTIONAL ASSESSMENT: PAIN_FUNCTIONAL_ASSESSMENT: 0-10

## 2024-08-14 ASSESSMENT — LIFESTYLE VARIABLES
HAVE YOU EVER FELT YOU SHOULD CUT DOWN ON YOUR DRINKING: NO
EVER HAD A DRINK FIRST THING IN THE MORNING TO STEADY YOUR NERVES TO GET RID OF A HANGOVER: NO
HAVE PEOPLE ANNOYED YOU BY CRITICIZING YOUR DRINKING: NO
EVER FELT BAD OR GUILTY ABOUT YOUR DRINKING: NO
TOTAL SCORE: 0

## 2024-08-14 ASSESSMENT — PAIN DESCRIPTION - ORIENTATION: ORIENTATION: LEFT

## 2024-08-14 ASSESSMENT — PATIENT HEALTH QUESTIONNAIRE - PHQ9
SUM OF ALL RESPONSES TO PHQ9 QUESTIONS 1 AND 2: 0
2. FEELING DOWN, DEPRESSED OR HOPELESS: NOT AT ALL
1. LITTLE INTEREST OR PLEASURE IN DOING THINGS: NOT AT ALL

## 2024-08-14 ASSESSMENT — PAIN SCALES - GENERAL: PAINLEVEL_OUTOF10: 5 - MODERATE PAIN

## 2024-08-14 ASSESSMENT — PAIN DESCRIPTION - DESCRIPTORS: DESCRIPTORS: ACHING

## 2024-08-14 ASSESSMENT — PAIN DESCRIPTION - PAIN TYPE: TYPE: ACUTE PAIN

## 2024-08-14 NOTE — PROGRESS NOTES
"  Acute Injury New Patient Visit    CC:   Chief Complaint   Patient presents with    Left Hand - Pain     Monday patient smashed her left hand between two totes, she did go to [facility] ER today and had xrays.       HPI: Shila \"Becca" is a 63 y.o. female presents today for evaluation for acute left hand injury sustained two days ago after she was smashed by 2 heavy bins. She was evaluated in the ER and had x-rays taken. She is here for initial evaluation.        Review of Systems   GENERAL: Negative for malaise, significant weight loss, fever  MUSCULOSKELETAL: See HPI  NEURO:  Negative for numbness / tingling     Past Medical History  Past Medical History:   Diagnosis Date    Abnormal weight loss 03/14/2018    Weight reduction    Achilles tendinitis, unspecified leg 03/14/2018    Achilles tendinitis    Acute maxillary sinusitis, unspecified 03/14/2018    Acute maxillary sinusitis    Anxiety disorder, unspecified 03/14/2018    Anxiety    Arthritis     Asthma (HHS-HCC)     Chronic sinusitis, unspecified 03/14/2018    Chronic sinusitis    Depression     GERD (gastroesophageal reflux disease)     Heart disease     Hyperlipidemia, unspecified 03/14/2018    Hyperlipemia    Hypertension     Hypoglycemia, unspecified 03/14/2018    Hypoglycemia    Obstructive sleep apnea (adult) (pediatric) 03/14/2018    WHIT (obstructive sleep apnea)    Other fatigue 03/14/2018    Fatigue    Polycythemia vera (Multi) 03/14/2018    Polycythemia vera    Polyneuropathy, unspecified 03/14/2018    Neuropathy    Viral wart, unspecified 03/14/2018    Cutaneous wart    Vision loss     Vitamin D deficiency, unspecified 03/14/2018    Vitamin D deficiency       Medication review  Medication Documentation Review Audit       Reviewed by Shila Fierro MA (Medical Assistant) on 08/14/24 at 1311      Medication Order Taking? Sig Documenting Provider Last Dose Status   aspirin 81 mg capsule [id number] No Take 1 tablet by mouth once daily. Historical Provider, " MD Taking Active   cholecalciferol (Vitamin D-3) 5,000 Units tablet [id number] No Take 1 tablet (5,000 Units) by mouth once daily. Historical Provider, MD Taking Active   DULoxetine (Cymbalta) 60 mg DR capsule [id number]  Take 1 capsule (60 mg) by mouth once daily. Yunier Corral, DO  Active   multivitamin (Multiple Vitamins) tablet [id number] No Take 1 tablet by mouth once daily. Historical Provider, MD Taking Active   nabumetone (Relafen) 750 mg tablet [id number]  Take 1 tablet (750 mg) by mouth every 12 hours. Yunier Corral DO  Active   pantoprazole (ProtoNix) 40 mg EC tablet [id number]  Take 1 tablet (40 mg) by mouth once daily. Yunier Corral DO  Active   telmisartan (MIcarDIS) 20 mg tablet [id number]  Take 1 tablet (20 mg) by mouth once daily. Yunier Corral DO  Active   Ventolin HFA 90 mcg/actuation inhaler [id number]  Inhale 2 puffs by mouth every 4 to 6 hours as needed. Yunier Corral DO  Active                    Allergies  Allergies   Allergen Reactions    Atorvastatin Itching       Social History  Social History     Socioeconomic History    Marital status:      Spouse name: Not on file    Number of children: Not on file    Years of education: Not on file    Highest education level: Not on file   Occupational History    Not on file   Tobacco Use    Smoking status: Every Day     Current packs/day: 1.00     Average packs/day: 1 pack/day for 50.0 years (50.0 ttl pk-yrs)     Types: Cigars, Cigarettes    Smokeless tobacco: Former   Vaping Use    Vaping status: Every Day    Substances: Nicotine    Devices: Disposable   Substance and Sexual Activity    Alcohol use: Yes     Comment: Occasional    Drug use: Never    Sexual activity: Defer   Other Topics Concern    Not on file   Social History Narrative    Not on file     Social Determinants of Health     Financial Resource Strain: Not on file   Food Insecurity: No Food Insecurity (1/12/2024)    Hunger Vital Sign     Worried About Running Out of  Food in the Last Year: Never true     Ran Out of Food in the Last Year: Never true   Transportation Needs: Not on file   Physical Activity: Not on file   Stress: Not on file   Social Connections: Not on file   Intimate Partner Violence: Not on file   Housing Stability: Unknown (2024)    Housing Stability Vital Sign     Unable to Pay for Housing in the Last Year: No     Number of Places Lived in the Last Year: Not on file     Unstable Housing in the Last Year: No       Surgical History  Past Surgical History:   Procedure Laterality Date    CARPAL TUNNEL RELEASE       SECTION, LOW TRANSVERSE      FOOT SURGERY      HYSTERECTOMY  2018    Hysterectomy    OTHER SURGICAL HISTORY  2018    Breast Surgery Puncture Aspiration Of Cyst Right Breast    VA BREAST AUGMENTATION WITH IMPLANT      TONSILLECTOMY         Physical Exam:  GENERAL:  Patient is awake, alert, and oriented to person place and time.  Patient appears well nourished and well kept.  Affect Calm, Not Acutely Distressed.  HEENT:  Normocephalic, Atraumatic, EOMI  CARDIOVASCULAR:  Hemodynamically stable.  RESPIRATORY:  Normal respirations with unlabored breathing.  Extremity: Left hand and wrist shows skin is intact.  Mild swelling with slight bruising along the first metacarpal bone.  There is no pain in the distal radius or distal limb.  No pain in the scaphoid bone.  Mainly soft to sinus over the proximal first metacarpal bone.  There is no pain of proximal distal phalanx left thumb.  EPL tendon intact.  There is no pain in the remaining metacarpals.  Her flexor and extensor mechanism tact.  No clinical signs of infection.  Distal pulses are palpable.  She is neurovascular intact.  Right hand and wrist was examined for comparison.      Diagnostics: X-rays reviewed  XR wrist left 3+ views, XR hand left 3+ views  Narrative: Interpreted By:  Cynthia Erazo,   STUDY:  XR WRIST LEFT 3+ VIEWS; XR HAND LEFT 3+ VIEWS; ;  2024 8:38 am     "  INDICATION:  Signs/Symptoms:Injury to hand 2 days ago, pain over base of the thumb.      COMPARISON:  None.      ACCESSION NUMBER(S):  [id number]; [id number]      ORDERING CLINICIAN:  ERIKA REA      FINDINGS:  PA, oblique and lateral views of the left hand and PA, oblique,  lateral and scaphoid views of the left wrist were obtained. No  fracture or dislocation is noted. Joint spaces are preserved.      Impression: No acute osseous abnormality          MACRO:  None      Signed by: Cynthia Erazo 8/14/2024 9:10 AM  Dictation workstation:   [id number]      Procedure: None    Assessment: Left hand sprain contusion    Plan: Shila \"Elinor\" presents today for initial evaluation for acute left hand injury sustained two days ago. X-rays showed no obvious fractures. We placed her into a wrist pro thumb spica brace for support, ice and anti-inflammatories. She will follow-up in 3-4 weeks, if she is still symptomatic, we will repeat x-rays of the left hand, 3 views, AP, lateral, and oblique views to rule out occult fracture.     No orders of the defined types were placed in this encounter.     At the conclusion of the visit there were no further questions by the patient/family regarding their plan of care.  Patient was instructed to call or return with any issues, questions, or concerns regarding their injury and/or treatment plan described above.     08/14/24 at 1:13 PM - Jovan Pearson MD  Scribe Attestation  By signing my name below, I, Domenico Cinthia, [place]rib[place]   attest that this documentation has been prepared under the direction and in the presence of Jovan Pearson MD.    Office: [phone]    This note was prepared using voice recognition software.  The details of this note are correct and have been reviewed, and corrected to the best of my ability.  Some grammatical errors may persist related to the Dragon software.  "

## 2024-08-14 NOTE — ED PROVIDER NOTES
EMERGENCY DEPARTMENT ENCOUNTER      Pt Name: Shila Botello  MRN: [id number]  Birthdate 1960  Date of evaluation: 8/14/2024  Provider: STEFANI LOPRESTI    CHIEF COMPLAINT       Chief Complaint   Patient presents with    Hand Injury     Pt states that she smashed her L hand between folding crates a few days ago and the pain and swelling continues to worsen and is spreading up into her wrist now as well.          HISTORY OF PRESENT ILLNESS    Patient is a 63-year-old female with a past medical history of hypertension hyperlipidemia presenting with left hand pain.  On Monday she was moving some totes in her hand got stuck between to the totes and has progressively come more tender.  She states that she has used ibuprofen and Tylenol which have not helped much.  She has been icing it which has helped.  Denies any sensation changes, decreased range of motion, or decreased strength.      History provided by:  Patient   used: No        Nursing Notes were reviewed.    PAST MEDICAL HISTORY     Past Medical History:   Diagnosis Date    Abnormal weight loss 03/14/2018    Weight reduction    Achilles tendinitis, unspecified leg 03/14/2018    Achilles tendinitis    Acute maxillary sinusitis, unspecified 03/14/2018    Acute maxillary sinusitis    Anxiety disorder, unspecified 03/14/2018    Anxiety    Arthritis     Asthma ([facility])     Chronic sinusitis, unspecified 03/14/2018    Chronic sinusitis    Depression     GERD (gastroesophageal reflux disease)     Heart disease     Hyperlipidemia, unspecified 03/14/2018    Hyperlipemia    Hypertension     Hypoglycemia, unspecified 03/14/2018    Hypoglycemia    Obstructive sleep apnea (adult) (pediatric) 03/14/2018    WHIT (obstructive sleep apnea)    Other fatigue 03/14/2018    Fatigue    Polycythemia vera (Multi) 03/14/2018    Polycythemia vera    Polyneuropathy, unspecified 03/14/2018    Neuropathy    Viral wart, unspecified 03/14/2018    Cutaneous wart     Vision loss     Vitamin D deficiency, unspecified 2018    Vitamin D deficiency         SURGICAL HISTORY       Past Surgical History:   Procedure Laterality Date    CARPAL TUNNEL RELEASE       SECTION, LOW TRANSVERSE      FOOT SURGERY      HYSTERECTOMY  2018    Hysterectomy    OTHER SURGICAL HISTORY  2018    Breast Surgery Puncture Aspiration Of Cyst Right Breast    ME BREAST AUGMENTATION WITH IMPLANT      TONSILLECTOMY           CURRENT MEDICATIONS       Previous Medications    ASPIRIN 81 MG CAPSULE    Take 1 tablet by mouth once daily.    CHOLECALCIFEROL (VITAMIN D-3) 5,000 UNITS TABLET    Take 1 tablet (5,000 Units) by mouth once daily.    DULOXETINE (CYMBALTA) 60 MG DR CAPSULE    Take 1 capsule (60 mg) by mouth once daily.    MULTIVITAMIN (MULTIPLE VITAMINS) TABLET    Take 1 tablet by mouth once daily.    NABUMETONE (RELAFEN) 750 MG TABLET    Take 1 tablet (750 mg) by mouth every 12 hours.    PANTOPRAZOLE (PROTONIX) 40 MG EC TABLET    Take 1 tablet (40 mg) by mouth once daily.    TELMISARTAN (MICARDIS) 20 MG TABLET    Take 1 tablet (20 mg) by mouth once daily.    VENTOLIN HFA 90 MCG/ACTUATION INHALER    Inhale 2 puffs by mouth every 4 to 6 hours as needed.       ALLERGIES     Atorvastatin    FAMILY HISTORY       Family History   Problem Relation Name Age of Onset    Cancer Other      Stroke Other            SOCIAL HISTORY       Social History     Socioeconomic History    Marital status:    Tobacco Use    Smoking status: Every Day     Current packs/day: 1.00     Average packs/day: 1 pack/day for 50.0 years (50.0 ttl pk-yrs)     Types: Cigars, Cigarettes    Smokeless tobacco: Former   Vaping Use    Vaping status: Every Day    Substances: Nicotine    Devices: Disposable   Substance and Sexual Activity    Alcohol use: Yes     Comment: Occasional    Drug use: Never    Sexual activity: Defer     Social Determinants of Health     Food Insecurity: No Food Insecurity (2024)    Hunger  Vital Sign     Worried About Running Out of Food in the Last Year: Never true     Ran Out of Food in the Last Year: Never true   Housing Stability: Unknown (1/12/2024)    Housing Stability Vital Sign     Unable to Pay for Housing in the Last Year: No     Unstable Housing in the Last Year: No       SCREENINGS                        PHYSICAL EXAM    (up to 7 for level 4, 8 or more for level 5)     ED Triage Vitals [08/14/24 0722]   Temperature Heart Rate Respirations BP   36.2 °C (97.2 °F) 61 18 153/67      Pulse Ox Temp Source Heart Rate Source Patient Position   97 % Temporal Monitor Sitting      BP Location FiO2 (%)     Right arm --       Physical Exam  Vitals reviewed.   HENT:      Head: Normocephalic.      Right Ear: External ear normal.      Left Ear: External ear normal.      Nose: Nose normal.   Eyes:      Conjunctiva/sclera: Conjunctivae normal.   Cardiovascular:      Rate and Rhythm: Normal rate.   Pulmonary:      Effort: Pulmonary effort is normal.   Abdominal:      General: Abdomen is flat.   Musculoskeletal:      Right wrist: Normal.      Left wrist: Snuff box tenderness present. No swelling or crepitus. Normal range of motion. Normal pulse.      Left hand: Swelling (1st metacarpal) and tenderness (1st metacarpal to distal radius) present. Normal range of motion. Normal strength. Normal sensation. Normal capillary refill. Normal pulse.      Cervical back: Normal range of motion.   Skin:     General: Skin is warm and dry.      Capillary Refill: Capillary refill takes less than 2 seconds.   Neurological:      General: No focal deficit present.      Mental Status: She is alert.   Psychiatric:         Mood and Affect: Mood normal.         Behavior: Behavior normal.          DIAGNOSTIC RESULTS     LABS:  Labs Reviewed - No data to display    All other labs were within normal range or not returned as of this dictation.    Imaging  No orders to display        Procedures  Procedures     EMERGENCY DEPARTMENT  COURSE/MDM:   Medical Decision Making  Patient is a 63-year-old female with a past medical history hypertension hyperlipidemia presenting with left hand pain since Monday.  She got her hand stuck in between 2 tablets and has noticed progressive tenderness and swelling.  Pain got better with ice.  She denies any sensation change, decreased range of motion, or decreased strength. Hand is soft with mild ecchymosis and no sign of compartment syndrome.  Pulses are 2+ with less than 2-second capillary refill.  She has full sensation, range of motion and 5 out of 5 strength in the left wrist and hand.    Differential diagnoses include but are not limited to fracture, soft tissue injury, dislocation, and comparment syndrome. Hand and wrist x-ray did not show any fractures or dislocations.  She was instructed to follow-up with orthopedics for repeat imaging of the scaphoid. Return precautions and discharge was discussed with the patient and she was agreeable to the plan.    Amount and/or Complexity of Data Reviewed  External Data Reviewed: notes.  Radiology: ordered. Decision-making details documented in ED Course.        Please see ED course for additional MDM.    ED Course as of 08/14/24 0933   Wed Aug 14, 2024   0930 Pt has hematoma over base 1st metacarpal with point tenderness to snuffbox highly suggestive of possible scaphoid fracture and/or fracture base 1st metacarpal. Sensation, resisted motor intact with brisk cap refill.  Placed in an OCL thumb spica splint for thumb immobilization, discussed mandatory orthopedic follow-up to rule out occult scaphoid fracture since she is directly tender over her snuffbox area today. [CR]      ED Course User Index  [CR] Juan Gutierrez MD         Diagnoses as of 08/14/24 0933   Left wrist pain        No orders to display       Patient and or family in agreement and understanding of treatment plan.  All questions answered.      I reviewed the case with the attending ED physician. The  attending ED physician agrees with the plan. Patient and/or patient´s representative was counseled regarding labs, imaging, likely diagnosis, and plan. All questions were answered.    ED Medications administered this visit:  Medications - No data to display    New Prescriptions from this visit:    New Prescriptions    No medications on file       Follow-up:  No follow-up provider specified.      Final Impression: No diagnosis found.      (Please note that portions of this note were completed with a voice recognition program.  Efforts were made to edit the dictations but occasionally words are mis-transcribed.)      Stefani Lopresti  08/18/24 [id number]

## 2024-08-14 NOTE — Clinical Note
Shila Botello was seen and treated in our emergency department on 8/14/2024.  She may return to work on 08/19/2024.  Or sooner if can tolerate work with orthopedic splint     If you have any questions or concerns, please don't hesitate to call.      Juan Gutierrez MD

## 2024-08-14 NOTE — DISCHARGE INSTRUCTIONS
You need to followup with an orthopedic doctor for additional management and treatment over the next week, please call today to get an appointment within the next week as you will need additional evaluation and potentially repeat imaging to assess for fractures of your scaphoid bone.  If you already have an orthopedic doctor, please call them tomorrow for their soonest followup appointment.  If you do not, please call the [facility], they have a walk in clinic where you can be seen same day, you can also call ahead to make an appointment.    [facility]  [phone]  Office Hours:  [facility]: Monday - Friday: 8 a.m. - 4 p.m.  Address:  [facility]  [address] , [address]  [place] [address]

## 2024-08-16 ENCOUNTER — LAB (OUTPATIENT)
Dept: LAB | Facility: LAB | Age: 64
End: 2024-08-16
Payer: COMMERCIAL

## 2024-08-16 DIAGNOSIS — E55.9 VITAMIN D DEFICIENCY: ICD-10-CM

## 2024-08-16 DIAGNOSIS — I10 PRIMARY HYPERTENSION: ICD-10-CM

## 2024-08-16 DIAGNOSIS — E78.2 MIXED HYPERLIPIDEMIA: ICD-10-CM

## 2024-08-16 DIAGNOSIS — Z13.29 THYROID DISORDER SCREENING: ICD-10-CM

## 2024-08-16 DIAGNOSIS — F41.9 ANXIETY: ICD-10-CM

## 2024-08-16 DIAGNOSIS — R73.03 PREDIABETES: ICD-10-CM

## 2024-08-16 LAB
25(OH)D3 SERPL-MCNC: 42 NG/ML (ref 30–100)
ALBUMIN SERPL BCP-MCNC: 4 G/DL (ref 3.4–5)
ALP SERPL-CCNC: 61 U/L (ref 33–136)
ALT SERPL W P-5'-P-CCNC: 10 U/L (ref 7–45)
ANION GAP SERPL CALC-SCNC: 12 MMOL/L (ref 10–20)
AST SERPL W P-5'-P-CCNC: 13 U/L (ref 9–39)
BASOPHILS # BLD AUTO: 0.07 X10*3/UL (ref 0–0.1)
BASOPHILS NFR BLD AUTO: 0.8 %
BILIRUB SERPL-MCNC: 0.5 MG/DL (ref 0–1.2)
BUN SERPL-MCNC: 19 MG/DL (ref 6–23)
CALCIUM SERPL-MCNC: 9.1 MG/DL (ref 8.6–10.3)
CHLORIDE SERPL-SCNC: 104 MMOL/L (ref 98–107)
CHOLEST SERPL-MCNC: 143 MG/DL (ref 0–199)
CHOLESTEROL/HDL RATIO: 3.2
CO2 SERPL-SCNC: 30 MMOL/L (ref 21–32)
CREAT SERPL-MCNC: 0.83 MG/DL (ref 0.5–1.05)
EGFRCR SERPLBLD CKD-EPI 2021: 79 ML/MIN/1.73M*2
EOSINOPHIL # BLD AUTO: 0.14 X10*3/UL (ref 0–0.7)
EOSINOPHIL NFR BLD AUTO: 1.6 %
ERYTHROCYTE [DISTWIDTH] IN BLOOD BY AUTOMATED COUNT: 14.1 % (ref 11.5–14.5)
GLUCOSE SERPL-MCNC: 99 MG/DL (ref 74–99)
HCT VFR BLD AUTO: 47.9 % (ref 36–46)
HDLC SERPL-MCNC: 45 MG/DL
HGB BLD-MCNC: 15.3 G/DL (ref 12–16)
IMM GRANULOCYTES # BLD AUTO: 0.05 X10*3/UL (ref 0–0.7)
IMM GRANULOCYTES NFR BLD AUTO: 0.6 % (ref 0–0.9)
LDLC SERPL CALC-MCNC: 72 MG/DL
LYMPHOCYTES # BLD AUTO: 1.62 X10*3/UL (ref 1.2–4.8)
LYMPHOCYTES NFR BLD AUTO: 18.9 %
MCH RBC QN AUTO: 32.3 PG (ref 26–34)
MCHC RBC AUTO-ENTMCNC: 31.9 G/DL (ref 32–36)
MCV RBC AUTO: 101 FL (ref 80–100)
MONOCYTES # BLD AUTO: 0.8 X10*3/UL (ref 0.1–1)
MONOCYTES NFR BLD AUTO: 9.3 %
NEUTROPHILS # BLD AUTO: 5.9 X10*3/UL (ref 1.2–7.7)
NEUTROPHILS NFR BLD AUTO: 68.8 %
NON HDL CHOLESTEROL: 98 MG/DL (ref 0–149)
NRBC BLD-RTO: 0 /100 WBCS (ref 0–0)
PLATELET # BLD AUTO: 233 X10*3/UL (ref 150–450)
POTASSIUM SERPL-SCNC: 4.6 MMOL/L (ref 3.5–5.3)
PROT SERPL-MCNC: 6.2 G/DL (ref 6.4–8.2)
RBC # BLD AUTO: 4.74 X10*6/UL (ref 4–5.2)
SODIUM SERPL-SCNC: 141 MMOL/L (ref 136–145)
TRIGL SERPL-MCNC: 128 MG/DL (ref 0–149)
TSH SERPL-ACNC: 2.03 MIU/L (ref 0.44–3.98)
VLDL: 26 MG/DL (ref 0–40)
WBC # BLD AUTO: 8.6 X10*3/UL (ref 4.4–11.3)

## 2024-08-16 PROCEDURE — 36415 COLL VENOUS BLD VENIPUNCTURE: CPT

## 2024-08-16 PROCEDURE — 80061 LIPID PANEL: CPT

## 2024-08-16 PROCEDURE — 80053 COMPREHEN METABOLIC PANEL: CPT

## 2024-08-16 PROCEDURE — 82306 VITAMIN D 25 HYDROXY: CPT

## 2024-08-16 PROCEDURE — 84443 ASSAY THYROID STIM HORMONE: CPT

## 2024-08-16 PROCEDURE — 83036 HEMOGLOBIN GLYCOSYLATED A1C: CPT

## 2024-08-16 PROCEDURE — 85025 COMPLETE CBC W/AUTO DIFF WBC: CPT

## 2024-08-17 LAB
EST. AVERAGE GLUCOSE BLD GHB EST-MCNC: 103 MG/DL
HBA1C MFR BLD: 5.2 %

## 2024-08-20 PROBLEM — R07.9 CHEST PAIN: Status: ACTIVE | Noted: 2023-07-13

## 2024-08-20 PROBLEM — D36.7 BENIGN NEOPLASM OF FOOT: Status: ACTIVE | Noted: 2024-08-20

## 2024-08-20 PROBLEM — F17.200 SMOKER: Status: ACTIVE | Noted: 2024-08-20

## 2024-08-20 PROBLEM — I11.0 HYPERTENSIVE HEART DISEASE WITH HEART FAILURE (MULTI): Status: ACTIVE | Noted: 2023-07-13

## 2024-08-20 PROBLEM — I21.9 MI (MYOCARDIAL INFARCTION) (MULTI): Status: ACTIVE | Noted: 2024-08-20

## 2024-08-20 PROBLEM — I25.2 PAST MYOCARDIAL INFARCTION: Status: ACTIVE | Noted: 2024-08-20

## 2024-08-20 PROBLEM — R05.9 COUGH, UNSPECIFIED: Status: RESOLVED | Noted: 2023-01-02 | Resolved: 2024-08-20

## 2024-08-20 PROBLEM — J01.00 ACUTE MAXILLARY SINUSITIS: Status: RESOLVED | Noted: 2017-11-22 | Resolved: 2024-08-20

## 2024-08-20 PROBLEM — I50.9 ACUTE CONGESTIVE HEART FAILURE (MULTI): Status: RESOLVED | Noted: 2023-07-13 | Resolved: 2024-08-20

## 2024-08-20 PROBLEM — H43.819 POSTERIOR VITREOUS DETACHMENT: Status: ACTIVE | Noted: 2018-02-03

## 2024-08-20 PROBLEM — Z20.822 CONTACT WITH AND (SUSPECTED) EXPOSURE TO COVID-19: Status: RESOLVED | Noted: 2023-01-02 | Resolved: 2024-08-20

## 2024-08-20 PROBLEM — R06.02 SHORTNESS OF BREATH AT REST: Status: ACTIVE | Noted: 2024-08-20

## 2024-08-20 PROBLEM — K21.9 GASTRO-ESOPHAGEAL REFLUX DISEASE WITHOUT ESOPHAGITIS: Status: ACTIVE | Noted: 2023-07-13

## 2024-08-20 PROBLEM — M76.899 TENDINITIS OF KNEE: Status: ACTIVE | Noted: 2024-08-20

## 2024-08-20 PROBLEM — M77.32: Status: ACTIVE | Noted: 2024-08-20

## 2024-08-20 PROBLEM — M70.50 BURSITIS OF KNEE: Status: ACTIVE | Noted: 2024-08-20

## 2024-08-21 RX ORDER — DULOXETIN HYDROCHLORIDE 60 MG/1
60 CAPSULE, DELAYED RELEASE ORAL DAILY
Qty: 90 CAPSULE | Refills: 1 | Status: SHIPPED | OUTPATIENT
Start: 2024-08-21 | End: 2024-08-23 | Stop reason: SDUPTHER

## 2024-08-21 RX ORDER — TELMISARTAN 20 MG/1
20 TABLET ORAL DAILY
Qty: 90 TABLET | Refills: 1 | Status: SHIPPED | OUTPATIENT
Start: 2024-08-21 | End: 2024-08-23

## 2024-08-23 ENCOUNTER — APPOINTMENT (OUTPATIENT)
Dept: PRIMARY CARE | Facility: CLINIC | Age: 64
End: 2024-08-23
Payer: COMMERCIAL

## 2024-08-23 VITALS
DIASTOLIC BLOOD PRESSURE: 91 MMHG | HEIGHT: 66 IN | WEIGHT: 255 LBS | HEART RATE: 74 BPM | BODY MASS INDEX: 40.98 KG/M2 | SYSTOLIC BLOOD PRESSURE: 152 MMHG | TEMPERATURE: 98 F

## 2024-08-23 DIAGNOSIS — F41.9 ANXIETY: ICD-10-CM

## 2024-08-23 DIAGNOSIS — Z00.00 HEALTH MAINTENANCE EXAMINATION: Primary | ICD-10-CM

## 2024-08-23 DIAGNOSIS — E66.813 CLASS 3 SEVERE OBESITY DUE TO EXCESS CALORIES WITH SERIOUS COMORBIDITY AND BODY MASS INDEX (BMI) OF 40.0 TO 44.9 IN ADULT: ICD-10-CM

## 2024-08-23 DIAGNOSIS — D75.89 MACROCYTOSIS WITHOUT ANEMIA: ICD-10-CM

## 2024-08-23 DIAGNOSIS — R35.1 NOCTURIA: ICD-10-CM

## 2024-08-23 DIAGNOSIS — K21.9 GASTROESOPHAGEAL REFLUX DISEASE WITHOUT ESOPHAGITIS: ICD-10-CM

## 2024-08-23 DIAGNOSIS — Z71.3 ENCOUNTER FOR WEIGHT LOSS COUNSELING: ICD-10-CM

## 2024-08-23 DIAGNOSIS — E78.2 MIXED HYPERLIPIDEMIA: ICD-10-CM

## 2024-08-23 DIAGNOSIS — R35.0 URINARY FREQUENCY: ICD-10-CM

## 2024-08-23 DIAGNOSIS — E55.9 VITAMIN D DEFICIENCY: ICD-10-CM

## 2024-08-23 DIAGNOSIS — Z23 IMMUNIZATION DUE: ICD-10-CM

## 2024-08-23 DIAGNOSIS — E66.01 CLASS 3 SEVERE OBESITY DUE TO EXCESS CALORIES WITH SERIOUS COMORBIDITY AND BODY MASS INDEX (BMI) OF 40.0 TO 44.9 IN ADULT: ICD-10-CM

## 2024-08-23 DIAGNOSIS — I10 PRIMARY HYPERTENSION: ICD-10-CM

## 2024-08-23 DIAGNOSIS — R73.03 PREDIABETES: ICD-10-CM

## 2024-08-23 PROCEDURE — 90750 HZV VACC RECOMBINANT IM: CPT | Performed by: INTERNAL MEDICINE

## 2024-08-23 PROCEDURE — 90715 TDAP VACCINE 7 YRS/> IM: CPT | Performed by: INTERNAL MEDICINE

## 2024-08-23 PROCEDURE — 3077F SYST BP >= 140 MM HG: CPT | Performed by: INTERNAL MEDICINE

## 2024-08-23 PROCEDURE — 3080F DIAST BP >= 90 MM HG: CPT | Performed by: INTERNAL MEDICINE

## 2024-08-23 PROCEDURE — 3008F BODY MASS INDEX DOCD: CPT | Performed by: INTERNAL MEDICINE

## 2024-08-23 PROCEDURE — 90471 IMMUNIZATION ADMIN: CPT | Performed by: INTERNAL MEDICINE

## 2024-08-23 PROCEDURE — 90472 IMMUNIZATION ADMIN EACH ADD: CPT | Performed by: INTERNAL MEDICINE

## 2024-08-23 PROCEDURE — 99396 PREV VISIT EST AGE 40-64: CPT | Performed by: INTERNAL MEDICINE

## 2024-08-23 PROCEDURE — 99214 OFFICE O/P EST MOD 30 MIN: CPT | Performed by: INTERNAL MEDICINE

## 2024-08-23 RX ORDER — BUPROPION HYDROCHLORIDE 150 MG/1
150 TABLET, EXTENDED RELEASE ORAL 2 TIMES DAILY
Qty: 180 TABLET | Refills: 1 | Status: SHIPPED | OUTPATIENT
Start: 2024-08-23 | End: 2025-01-14

## 2024-08-23 RX ORDER — OXYBUTYNIN CHLORIDE 5 MG/1
5 TABLET, EXTENDED RELEASE ORAL DAILY
Qty: 90 TABLET | Refills: 1 | Status: SHIPPED | OUTPATIENT
Start: 2024-08-23 | End: 2025-01-14

## 2024-08-23 RX ORDER — NALTREXONE HYDROCHLORIDE 50 MG/1
50 TABLET, FILM COATED ORAL DAILY
Qty: 90 TABLET | Refills: 1 | Status: SHIPPED | OUTPATIENT
Start: 2024-08-23 | End: 2025-02-20 | Stop reason: SDUPTHER

## 2024-08-23 RX ORDER — DULOXETIN HYDROCHLORIDE 60 MG/1
60 CAPSULE, DELAYED RELEASE ORAL DAILY
Qty: 90 CAPSULE | Refills: 1 | Status: SHIPPED | OUTPATIENT
Start: 2024-08-23 | End: 2025-02-20 | Stop reason: SDUPTHER

## 2024-08-23 RX ORDER — TELMISARTAN 40 MG/1
40 TABLET ORAL DAILY
Qty: 90 TABLET | Refills: 1 | Status: SHIPPED | OUTPATIENT
Start: 2024-08-23 | End: 2024-09-26

## 2024-08-23 RX ORDER — ATORVASTATIN CALCIUM 80 MG/1
TABLET, FILM COATED ORAL
COMMUNITY
Start: 2023-02-06 | End: 2024-08-23 | Stop reason: SINTOL

## 2024-08-23 RX ORDER — PANTOPRAZOLE SODIUM 40 MG/1
40 TABLET, DELAYED RELEASE ORAL DAILY
Qty: 90 TABLET | Refills: 1 | Status: SHIPPED | OUTPATIENT
Start: 2024-08-23 | End: 2025-02-20 | Stop reason: SDUPTHER

## 2024-08-23 ASSESSMENT — ACTIVITIES OF DAILY LIVING (ADL)
DRESSING: INDEPENDENT
TAKING_MEDICATION: INDEPENDENT
MANAGING_FINANCES: INDEPENDENT
GROCERY_SHOPPING: INDEPENDENT
DOING_HOUSEWORK: INDEPENDENT
BATHING: INDEPENDENT

## 2024-08-23 ASSESSMENT — ENCOUNTER SYMPTOMS
DEPRESSION: 0
OCCASIONAL FEELINGS OF UNSTEADINESS: 0
LOSS OF SENSATION IN FEET: 0

## 2024-08-23 ASSESSMENT — COLUMBIA-SUICIDE SEVERITY RATING SCALE - C-SSRS
6. HAVE YOU EVER DONE ANYTHING, STARTED TO DO ANYTHING, OR PREPARED TO DO ANYTHING TO END YOUR LIFE?: NO
2. HAVE YOU ACTUALLY HAD ANY THOUGHTS OF KILLING YOURSELF?: NO
1. IN THE PAST MONTH, HAVE YOU WISHED YOU WERE DEAD OR WISHED YOU COULD GO TO SLEEP AND NOT WAKE UP?: NO

## 2024-08-23 NOTE — PROGRESS NOTES
Subjective   Elinor Botello is a 63 y.o. female and is here for a comprehensive physical exam. The patient reports problems - multiple issues .    Do you take any herbs or supplements that were not prescribed by a doctor? no  Are you taking calcium supplements? no  Are you taking aspirin daily? yes       Patient is a 63-year-old female presents to the office today for annual exam.  She is up-to-date on age and gender recommended screening exception of mammogram which has been ordered.  Patient up-to-date on colonoscopy.  Patient does not want to do lung cancer screening.  She wants to wait until she is on Medicare.  Patient is up-to-date on age and gender recommend immunizations with exception of second shingles vaccine which will be given today as well as tetanus vaccine. Patient needs refills at this time on chronic medications.  She is on Wellbutrin and naltrexone for weight loss, she is on oxybutynin for overactive bladder pantoprazole for GERD telmisartan for blood pressure.  Blood work will be ordered for her next visit.        Review of Systems   Constitutional:  Negative for chills and fever.   HENT:  Negative for sore throat.    Eyes:  Negative for visual disturbance.   Respiratory:  Negative for cough and shortness of breath.    Cardiovascular:  Negative for chest pain and leg swelling.   Gastrointestinal:  Negative for abdominal pain, blood in stool, constipation, diarrhea, nausea and vomiting.   Genitourinary:  Positive for frequency. Negative for dysuria.   Skin:  Negative for rash.   Neurological:  Negative for dizziness, syncope and light-headedness.   Psychiatric/Behavioral:  Negative for agitation and confusion.         Objective   Physical Exam  Vitals and nursing note reviewed.   Constitutional:       General: She is not in acute distress.     Appearance: Normal appearance. She is obese. She is not ill-appearing, toxic-appearing or diaphoretic.   HENT:      Head: Normocephalic and atraumatic.       Mouth/Throat:      Mouth: Mucous membranes are moist.      Pharynx: Oropharynx is clear. No oropharyngeal exudate.   Eyes:      Pupils: Pupils are equal, round, and reactive to light.   Cardiovascular:      Rate and Rhythm: Normal rate and regular rhythm.      Heart sounds: Normal heart sounds.   Pulmonary:      Effort: Pulmonary effort is normal. No respiratory distress.      Breath sounds: Normal breath sounds. No wheezing, rhonchi or rales.   Abdominal:      General: There is no distension.      Palpations: Abdomen is soft.      Tenderness: There is no abdominal tenderness. There is no guarding.   Musculoskeletal:      Cervical back: Neck supple.      Right lower leg: No edema.      Left lower leg: No edema.   Lymphadenopathy:      Cervical: No cervical adenopathy.   Skin:     General: Skin is warm and dry.      Coloration: Skin is not jaundiced or pale.      Findings: No rash.   Neurological:      General: No focal deficit present.      Mental Status: She is alert and oriented to person, place, and time.   Psychiatric:         Mood and Affect: Mood normal.         Behavior: Behavior normal.         Thought Content: Thought content normal.         Assessment/Plan   Healthy female exam.      1.  The patient due for mammogram which has been ordered bone density which she defers also defers lung cancer screening.  Patient due for vaccinations of shingles vaccine which will be given today.  Also be updated on tetanus vaccine.  2. Patient Counseling:  --Nutrition: Stressed importance of moderation in sodium/caffeine intake, saturated fat and cholesterol, caloric balance, sufficient intake of fresh fruits, vegetables, fiber, calcium, iron, and 1 mg of folate supplement per day (for females capable of pregnancy).  --Discussed the issue of estrogen replacement, calcium supplement, and the daily use of baby aspirin.  --Exercise: Stressed the importance of regular exercise.   --Substance Abuse: Discussed cessation/primary  prevention of tobacco, alcohol, or other drug use; driving or other dangerous activities under the influence; availability of treatment for abuse.    --Sexuality: Discussed sexually transmitted diseases, partner selection, use of condoms, avoidance of unintended pregnancy  and contraceptive alternatives.   --Injury prevention: Discussed safety belts, safety helmets, smoke detector, smoking near bedding or upholstery.   --Dental health: Discussed importance of regular tooth brushing, flossing, and dental visits.  --Immunizations reviewed.  --Discussed benefits of screening colonoscopy.  --After hours service discussed with patient  3. Discussed the patient's BMI with her.  The BMI is above average. The patient received Provided instructions on dietary changes because they have an above normal BMI.  4. Follow up in 6 months    Nocturia/urinary frequency: On a check a urinalysis for further evaluation.  Continue oxybutynin    Encounter for weight loss counseling: She will continue on naltrexone paired with Wellbutrin    Hypertension: Blood pressure is elevated in the office today advise low-sodium diet, exercise.  May need to consider additional blood pressure medication.  Currently she is on telmisartan    GERD: Chronic, stable continue pantoprazole    Anxiety: Chronic, stable continue duloxetine

## 2024-09-12 ENCOUNTER — APPOINTMENT (OUTPATIENT)
Dept: ORTHOPEDIC SURGERY | Facility: CLINIC | Age: 64
End: 2024-09-12
Payer: COMMERCIAL

## 2024-09-26 ENCOUNTER — APPOINTMENT (OUTPATIENT)
Dept: PRIMARY CARE | Facility: CLINIC | Age: 64
End: 2024-09-26
Payer: COMMERCIAL

## 2024-09-26 VITALS
HEART RATE: 63 BPM | DIASTOLIC BLOOD PRESSURE: 80 MMHG | HEIGHT: 66 IN | SYSTOLIC BLOOD PRESSURE: 151 MMHG | TEMPERATURE: 98.1 F | WEIGHT: 255 LBS | BODY MASS INDEX: 40.98 KG/M2

## 2024-09-26 DIAGNOSIS — I10 PRIMARY HYPERTENSION: Primary | ICD-10-CM

## 2024-09-26 DIAGNOSIS — R35.0 URINARY FREQUENCY: ICD-10-CM

## 2024-09-26 DIAGNOSIS — R35.1 NOCTURIA: ICD-10-CM

## 2024-09-26 DIAGNOSIS — R42 VERTIGO: ICD-10-CM

## 2024-09-26 DIAGNOSIS — H66.002 NON-RECURRENT ACUTE SUPPURATIVE OTITIS MEDIA OF LEFT EAR WITHOUT SPONTANEOUS RUPTURE OF TYMPANIC MEMBRANE: ICD-10-CM

## 2024-09-26 DIAGNOSIS — R82.90 ABNORMAL URINALYSIS: ICD-10-CM

## 2024-09-26 LAB
APPEARANCE UR: ABNORMAL
BACTERIA #/AREA URNS AUTO: ABNORMAL /HPF
BILIRUB UR STRIP.AUTO-MCNC: NEGATIVE MG/DL
COLOR UR: YELLOW
GLUCOSE UR STRIP.AUTO-MCNC: NORMAL MG/DL
KETONES UR STRIP.AUTO-MCNC: NEGATIVE MG/DL
LEUKOCYTE ESTERASE UR QL STRIP.AUTO: ABNORMAL
NITRITE UR QL STRIP.AUTO: NEGATIVE
PH UR STRIP.AUTO: 6 [PH]
PROT UR STRIP.AUTO-MCNC: NEGATIVE MG/DL
RBC # UR STRIP.AUTO: ABNORMAL /UL
RBC #/AREA URNS AUTO: ABNORMAL /HPF
SP GR UR STRIP.AUTO: 1.02
SQUAMOUS #/AREA URNS AUTO: ABNORMAL /HPF
UROBILINOGEN UR STRIP.AUTO-MCNC: NORMAL MG/DL
WBC #/AREA URNS AUTO: ABNORMAL /HPF

## 2024-09-26 PROCEDURE — 99214 OFFICE O/P EST MOD 30 MIN: CPT | Performed by: INTERNAL MEDICINE

## 2024-09-26 PROCEDURE — 81001 URINALYSIS AUTO W/SCOPE: CPT

## 2024-09-26 PROCEDURE — 87086 URINE CULTURE/COLONY COUNT: CPT

## 2024-09-26 PROCEDURE — 3079F DIAST BP 80-89 MM HG: CPT | Performed by: INTERNAL MEDICINE

## 2024-09-26 PROCEDURE — 3077F SYST BP >= 140 MM HG: CPT | Performed by: INTERNAL MEDICINE

## 2024-09-26 PROCEDURE — 3008F BODY MASS INDEX DOCD: CPT | Performed by: INTERNAL MEDICINE

## 2024-09-26 RX ORDER — AMOXICILLIN AND CLAVULANATE POTASSIUM 875; 125 MG/1; MG/1
875 TABLET, FILM COATED ORAL 2 TIMES DAILY
Qty: 20 TABLET | Refills: 0 | Status: SHIPPED | OUTPATIENT
Start: 2024-09-26 | End: 2024-10-06

## 2024-09-26 RX ORDER — TELMISARTAN 80 MG/1
80 TABLET ORAL DAILY
Qty: 90 TABLET | Refills: 3 | Status: SHIPPED | OUTPATIENT
Start: 2024-09-26 | End: 2025-09-26

## 2024-09-26 RX ORDER — MECLIZINE HCL 12.5 MG 12.5 MG/1
12.5 TABLET ORAL 3 TIMES DAILY PRN
Qty: 30 TABLET | Refills: 0 | Status: SHIPPED | OUTPATIENT
Start: 2024-09-26 | End: 2024-10-06

## 2024-09-26 ASSESSMENT — PATIENT HEALTH QUESTIONNAIRE - PHQ9
2. FEELING DOWN, DEPRESSED OR HOPELESS: NOT AT ALL
SUM OF ALL RESPONSES TO PHQ9 QUESTIONS 1 AND 2: 0
1. LITTLE INTEREST OR PLEASURE IN DOING THINGS: NOT AT ALL

## 2024-09-26 NOTE — PROGRESS NOTES
"Subjective   Patient ID: Elinor Botello is a 63 y.o. female who presents for Follow-up (Since Sunday has been feeling lightheaded.).    HPI Patient having episodes of dizziness intermittently that started 5 days ago. She was standing putting clothes on the hangers and the room started spinning and she thought she might throw up. She felt really hot. She denies CP, SOB, headache.  She had blurry vision. She had a similar episode this morning. She has never had this in the past. Patient felt like she was going to throw up.  The room is spinning. Denies numbness or weakness. No slurred speech. No facial droop. No actual syncope. The episode today was worse. She was sitting up in bed and the symptoms happened today.      Patient is having urinary frequency for 2 years.  Denies burning with urination.     Review of Systems   Constitutional:  Negative for chills and fever.   HENT:  Negative for sore throat.    Eyes:  Negative for visual disturbance.   Respiratory:  Negative for cough and shortness of breath.    Cardiovascular:  Negative for chest pain and leg swelling.   Gastrointestinal:  Positive for nausea. Negative for abdominal pain, constipation, diarrhea and vomiting.   Genitourinary:  Positive for frequency.   Skin:  Negative for rash.   Neurological:  Positive for dizziness. Negative for seizures, syncope, facial asymmetry, speech difficulty, weakness, numbness and headaches.   Psychiatric/Behavioral:  Negative for agitation and confusion.        Objective   /80 (BP Location: Left arm, Patient Position: Sitting, BP Cuff Size: Large adult)   Pulse 63   Temp 36.7 °C (98.1 °F)   Ht 1.676 m (5' 6\")   Wt 116 kg (255 lb)   BMI 41.16 kg/m²     Physical Exam  Vitals and nursing note reviewed.   Constitutional:       General: She is not in acute distress.     Appearance: Normal appearance. She is obese. She is not ill-appearing, toxic-appearing or diaphoretic.   HENT:      Head: Normocephalic and atraumatic.      " Left Ear: Tympanic membrane is erythematous.      Mouth/Throat:      Mouth: Mucous membranes are moist.      Pharynx: Oropharynx is clear. No oropharyngeal exudate.   Eyes:      Pupils: Pupils are equal, round, and reactive to light.   Cardiovascular:      Rate and Rhythm: Normal rate and regular rhythm.      Heart sounds: Normal heart sounds.   Pulmonary:      Effort: Pulmonary effort is normal. No respiratory distress.      Breath sounds: Normal breath sounds. No wheezing, rhonchi or rales.   Abdominal:      General: Bowel sounds are normal. There is no distension.      Palpations: Abdomen is soft.      Tenderness: There is no abdominal tenderness.   Musculoskeletal:      Cervical back: Neck supple.      Right lower leg: No edema.      Left lower leg: No edema.   Lymphadenopathy:      Cervical: No cervical adenopathy.   Skin:     General: Skin is warm and dry.      Coloration: Skin is not jaundiced or pale.      Findings: No rash.   Neurological:      General: No focal deficit present.      Mental Status: She is alert and oriented to person, place, and time.      Cranial Nerves: No cranial nerve deficit.   Psychiatric:         Mood and Affect: Mood normal.         Behavior: Behavior normal.         Thought Content: Thought content normal.         Judgment: Judgment normal.         Assessment/Plan   Problem List Items Addressed This Visit           ICD-10-CM    HTN (hypertension) - Primary I10    Vertigo R42    Urinary frequency R35.0    Relevant Orders    Urinalysis with Reflex Microscopic (Completed)    Microscopic Only, Urine (Completed)    Nocturia R35.1    Relevant Orders    Urinalysis with Reflex Microscopic (Completed)    Microscopic Only, Urine (Completed)    Non-recurrent acute suppurative otitis media of left ear without spontaneous rupture of tympanic membrane H66.002    Abnormal urinalysis R82.90     Hypertension: Chronic, uncontrolled increase telmisartan to 80 mg daily.    Urinary frequency/nocturia:  Will send urinalysis and urine culture she is already going be on antibiotics for an ear infection.    None recurrent acute otitis media of the left ear: This may be contributing to patient's vertigo.  She will be treated with Augmentin.    Vertigo: Patient's symptoms are most consistent with vertigo in terms of her dizziness she will be given meclizine for acute treatment.  If she has any worsening symptoms or unable to keep any liquids or food down or focal neurological symptoms such as slurred speech facial droop vision loss focal numbness or weakness to go to the ER.

## 2024-09-27 DIAGNOSIS — D75.89 MACROCYTOSIS WITHOUT ANEMIA: ICD-10-CM

## 2024-09-27 DIAGNOSIS — R82.90 ABNORMAL URINALYSIS: Primary | ICD-10-CM

## 2024-09-27 DIAGNOSIS — R35.0 URINARY FREQUENCY: ICD-10-CM

## 2024-09-27 NOTE — RESULT ENCOUNTER NOTE
Patient's urine showed possible infection and microscopic blood in the urine. She is already on antibiotics that will actually treat a possible urinary tract infection.  We will send a urine culture. We can repeat a urine test when she follows up for recheck.

## 2024-09-29 LAB — BACTERIA UR CULT: NORMAL

## 2024-09-30 DIAGNOSIS — E66.01 CLASS 3 SEVERE OBESITY DUE TO EXCESS CALORIES WITH SERIOUS COMORBIDITY AND BODY MASS INDEX (BMI) OF 40.0 TO 44.9 IN ADULT: ICD-10-CM

## 2024-09-30 DIAGNOSIS — Z71.3 ENCOUNTER FOR WEIGHT LOSS COUNSELING: ICD-10-CM

## 2024-09-30 DIAGNOSIS — M43.06 LUMBAR SPONDYLOLYSIS: ICD-10-CM

## 2024-09-30 DIAGNOSIS — E66.813 CLASS 3 SEVERE OBESITY DUE TO EXCESS CALORIES WITH SERIOUS COMORBIDITY AND BODY MASS INDEX (BMI) OF 40.0 TO 44.9 IN ADULT: ICD-10-CM

## 2024-09-30 RX ORDER — NABUMETONE 750 MG/1
750 TABLET, FILM COATED ORAL 2 TIMES DAILY
COMMUNITY
End: 2024-09-30 | Stop reason: SDUPTHER

## 2024-09-30 NOTE — TELEPHONE ENCOUNTER
LOV 09/26/2024  POV 10/24/2024    Patient also states that she was seen last and was found to have an ear infection.  She states that she off balance when walking and feels lightheaded.  These are new sx's since being seen.  She wants to know what to do and if to keep taking the abx?

## 2024-10-01 ENCOUNTER — TELEPHONE (OUTPATIENT)
Dept: CARDIOLOGY | Facility: CLINIC | Age: 64
End: 2024-10-01
Payer: COMMERCIAL

## 2024-10-01 NOTE — TELEPHONE ENCOUNTER
----- Message from Yunier Corral sent at 9/29/2024 11:51 PM EDT -----  Urine culture showed no infection of the urine as it did not grow any bacteria.

## 2024-10-01 NOTE — TELEPHONE ENCOUNTER
----- Message from Yunier Corral sent at 9/27/2024  1:33 PM EDT -----  Patient's urine showed possible infection and microscopic blood in the urine. She is already on antibiotics that will actually treat a possible urinary tract infection.  We will send a urine culture. We can repeat a urine test when she follows up for   recheck.

## 2024-10-03 RX ORDER — NABUMETONE 750 MG/1
750 TABLET, FILM COATED ORAL 2 TIMES DAILY
Qty: 180 TABLET | Refills: 1 | Status: SHIPPED | OUTPATIENT
Start: 2024-10-03 | End: 2025-04-01

## 2024-10-07 ENCOUNTER — OFFICE VISIT (OUTPATIENT)
Dept: PRIMARY CARE | Facility: CLINIC | Age: 64
End: 2024-10-07
Payer: COMMERCIAL

## 2024-10-07 VITALS
BODY MASS INDEX: 42.59 KG/M2 | DIASTOLIC BLOOD PRESSURE: 80 MMHG | WEIGHT: 265 LBS | HEIGHT: 66 IN | HEART RATE: 76 BPM | SYSTOLIC BLOOD PRESSURE: 142 MMHG

## 2024-10-07 DIAGNOSIS — R42 DIZZINESS: Primary | ICD-10-CM

## 2024-10-07 PROCEDURE — 3079F DIAST BP 80-89 MM HG: CPT | Performed by: FAMILY MEDICINE

## 2024-10-07 PROCEDURE — 3008F BODY MASS INDEX DOCD: CPT | Performed by: FAMILY MEDICINE

## 2024-10-07 PROCEDURE — 4004F PT TOBACCO SCREEN RCVD TLK: CPT | Performed by: FAMILY MEDICINE

## 2024-10-07 PROCEDURE — 99213 OFFICE O/P EST LOW 20 MIN: CPT | Performed by: FAMILY MEDICINE

## 2024-10-07 PROCEDURE — 3077F SYST BP >= 140 MM HG: CPT | Performed by: FAMILY MEDICINE

## 2024-10-07 RX ORDER — FLUTICASONE PROPIONATE 50 MCG
1 SPRAY, SUSPENSION (ML) NASAL DAILY
Qty: 16 G | Refills: 11 | Status: SHIPPED | OUTPATIENT
Start: 2024-10-07 | End: 2025-10-07

## 2024-10-07 ASSESSMENT — PATIENT HEALTH QUESTIONNAIRE - PHQ9
2. FEELING DOWN, DEPRESSED OR HOPELESS: NOT AT ALL
1. LITTLE INTEREST OR PLEASURE IN DOING THINGS: NOT AT ALL
SUM OF ALL RESPONSES TO PHQ9 QUESTIONS 1 AND 2: 0

## 2024-10-07 ASSESSMENT — ENCOUNTER SYMPTOMS
FATIGUE: 0
DIZZINESS: 0
ACTIVITY CHANGE: 0
SHORTNESS OF BREATH: 0
FEVER: 0
HEADACHES: 0

## 2024-10-07 NOTE — PROGRESS NOTES
"Subjective   Patient ID: Elinor Botello is a 63 y.o. female who presents for Sick Visit (Has been having dizzy spells was treated for ear inf. ).    Dizziness   - was treated for an ear infection around 2 weeks ago   - reports dizziness is not all the time, does not notice it when sitting/laying   - symptoms are worse when up and walking around for long periods   - also notices when she bends over to pick something up she is worse   - had a fall over the weekend because she could not straighten out her balance   - denies any significant numbness, tingling or weakness   - has been taking meclizine to help which is helping a little with the dizziness but is not solving her problem            Review of Systems   Constitutional:  Negative for activity change, fatigue and fever.   Respiratory:  Negative for shortness of breath.    Cardiovascular:  Negative for chest pain.   Neurological:  Negative for dizziness and headaches.       Objective   /80   Pulse 76   Ht 1.676 m (5' 6\")   Wt 120 kg (265 lb)   BMI 42.77 kg/m²     Physical Exam  Constitutional:       Appearance: Normal appearance.   HENT:      Right Ear: Tympanic membrane normal.      Left Ear: Tympanic membrane normal.      Ears:      Comments: Effusion behind L ear      Nose: Nose normal.   Neurological:      General: No focal deficit present.      Mental Status: She is alert and oriented to person, place, and time.         Assessment/Plan   Problem List Items Addressed This Visit    None  Visit Diagnoses         Codes    Dizziness    -  Primary R42    stable   - flonase to treat effusion   - referral placed to ENT   - f/u with specialist     Relevant Medications    fluticasone (Flonase) 50 mcg/actuation nasal spray    Other Relevant Orders    Referral to ENT               "

## 2024-10-16 ASSESSMENT — ENCOUNTER SYMPTOMS
NUMBNESS: 0
CHILLS: 0
SPEECH DIFFICULTY: 0
HEADACHES: 0
DIZZINESS: 1
SEIZURES: 0
FACIAL ASYMMETRY: 0
WEAKNESS: 0
FEVER: 0

## 2024-10-24 ENCOUNTER — APPOINTMENT (OUTPATIENT)
Dept: PRIMARY CARE | Facility: CLINIC | Age: 64
End: 2024-10-24
Payer: COMMERCIAL

## 2024-11-01 ENCOUNTER — APPOINTMENT (OUTPATIENT)
Dept: PRIMARY CARE | Facility: CLINIC | Age: 64
End: 2024-11-01
Payer: COMMERCIAL

## 2024-11-01 VITALS
DIASTOLIC BLOOD PRESSURE: 75 MMHG | WEIGHT: 257.6 LBS | BODY MASS INDEX: 41.4 KG/M2 | HEIGHT: 66 IN | TEMPERATURE: 96.5 F | SYSTOLIC BLOOD PRESSURE: 146 MMHG | HEART RATE: 65 BPM

## 2024-11-01 DIAGNOSIS — I10 PRIMARY HYPERTENSION: Primary | ICD-10-CM

## 2024-11-01 DIAGNOSIS — E66.01 MORBID OBESITY WITH BMI OF 40.0-44.9, ADULT (MULTI): ICD-10-CM

## 2024-11-01 DIAGNOSIS — M43.06 LUMBAR SPONDYLOLYSIS: ICD-10-CM

## 2024-11-01 DIAGNOSIS — Z71.3 ENCOUNTER FOR WEIGHT LOSS COUNSELING: ICD-10-CM

## 2024-11-01 DIAGNOSIS — R42 VERTIGO: ICD-10-CM

## 2024-11-01 DIAGNOSIS — Z23 ENCOUNTER FOR IMMUNIZATION: ICD-10-CM

## 2024-11-01 PROCEDURE — 90656 IIV3 VACC NO PRSV 0.5 ML IM: CPT | Performed by: INTERNAL MEDICINE

## 2024-11-01 PROCEDURE — 3008F BODY MASS INDEX DOCD: CPT | Performed by: INTERNAL MEDICINE

## 2024-11-01 PROCEDURE — 99214 OFFICE O/P EST MOD 30 MIN: CPT | Performed by: INTERNAL MEDICINE

## 2024-11-01 PROCEDURE — 4004F PT TOBACCO SCREEN RCVD TLK: CPT | Performed by: INTERNAL MEDICINE

## 2024-11-01 PROCEDURE — 3078F DIAST BP <80 MM HG: CPT | Performed by: INTERNAL MEDICINE

## 2024-11-01 PROCEDURE — 3077F SYST BP >= 140 MM HG: CPT | Performed by: INTERNAL MEDICINE

## 2024-11-01 PROCEDURE — 90471 IMMUNIZATION ADMIN: CPT | Performed by: INTERNAL MEDICINE

## 2024-11-01 RX ORDER — AMLODIPINE BESYLATE 5 MG/1
5 TABLET ORAL DAILY
Qty: 90 TABLET | Refills: 1 | Status: SHIPPED | OUTPATIENT
Start: 2024-11-01 | End: 2025-04-30

## 2024-11-01 RX ORDER — TELMISARTAN 80 MG/1
80 TABLET ORAL DAILY
Qty: 90 TABLET | Refills: 3 | Status: SHIPPED | OUTPATIENT
Start: 2024-11-01 | End: 2025-11-01

## 2024-11-01 RX ORDER — NABUMETONE 750 MG/1
750 TABLET, FILM COATED ORAL 2 TIMES DAILY
Qty: 180 TABLET | Refills: 1 | Status: CANCELLED | OUTPATIENT
Start: 2024-11-01 | End: 2025-04-30

## 2024-11-01 ASSESSMENT — ENCOUNTER SYMPTOMS
CHILLS: 0
LIGHT-HEADEDNESS: 0
DIZZINESS: 0
NAUSEA: 0
COUGH: 0
SHORTNESS OF BREATH: 0
SORE THROAT: 0
ABDOMINAL PAIN: 0
AGITATION: 0
VOMITING: 0
PALPITATIONS: 0
CONFUSION: 0
FEVER: 0

## 2024-11-07 DIAGNOSIS — M43.06 LUMBAR SPONDYLOLYSIS: ICD-10-CM

## 2024-11-07 DIAGNOSIS — M47.816 LUMBAR SPONDYLOSIS: ICD-10-CM

## 2024-11-08 RX ORDER — NABUMETONE 750 MG/1
750 TABLET, FILM COATED ORAL EVERY 12 HOURS
Qty: 180 TABLET | Refills: 0 | OUTPATIENT
Start: 2024-11-08

## 2024-11-12 NOTE — TELEPHONE ENCOUNTER
Patient called and stated that at her OV, she didn't realize she was taking Nabumetone for her back.  She now is asking for a new rx be sent to [facility] in chart.    LOV 11/1/24  POV none

## 2024-11-13 RX ORDER — NABUMETONE 750 MG/1
750 TABLET, FILM COATED ORAL 2 TIMES DAILY
Qty: 180 TABLET | Refills: 1 | Status: SHIPPED | OUTPATIENT
Start: 2024-11-13 | End: 2025-05-12

## 2024-12-12 NOTE — TELEPHONE ENCOUNTER
Patient called stating she has a new pharmacy and needs her meds sent to her new pharmacy.  Tried to call 2 times but goes straight to  and then ends call.  Not able to LVM.    LOV 11/1/2024  POV 2/20/2025

## 2025-01-08 DIAGNOSIS — R35.1 NOCTURIA: ICD-10-CM

## 2025-01-08 DIAGNOSIS — Z71.3 ENCOUNTER FOR WEIGHT LOSS COUNSELING: ICD-10-CM

## 2025-01-08 DIAGNOSIS — R35.0 URINARY FREQUENCY: ICD-10-CM

## 2025-01-08 DIAGNOSIS — E66.813 CLASS 3 SEVERE OBESITY DUE TO EXCESS CALORIES WITH SERIOUS COMORBIDITY AND BODY MASS INDEX (BMI) OF 40.0 TO 44.9 IN ADULT: ICD-10-CM

## 2025-01-08 DIAGNOSIS — E66.01 CLASS 3 SEVERE OBESITY DUE TO EXCESS CALORIES WITH SERIOUS COMORBIDITY AND BODY MASS INDEX (BMI) OF 40.0 TO 44.9 IN ADULT: ICD-10-CM

## 2025-01-14 RX ORDER — OXYBUTYNIN CHLORIDE 5 MG/1
5 TABLET, EXTENDED RELEASE ORAL DAILY
Qty: 90 TABLET | Refills: 0 | Status: SHIPPED | OUTPATIENT
Start: 2025-01-14

## 2025-01-14 RX ORDER — BUPROPION HYDROCHLORIDE 150 MG/1
150 TABLET, EXTENDED RELEASE ORAL 2 TIMES DAILY
Qty: 180 TABLET | Refills: 0 | Status: SHIPPED | OUTPATIENT
Start: 2025-01-14

## 2025-02-20 ENCOUNTER — APPOINTMENT (OUTPATIENT)
Dept: PRIMARY CARE | Facility: CLINIC | Age: 65
End: 2025-02-20
Payer: COMMERCIAL

## 2025-02-20 VITALS
TEMPERATURE: 96.6 F | SYSTOLIC BLOOD PRESSURE: 119 MMHG | HEART RATE: 67 BPM | BODY MASS INDEX: 40.79 KG/M2 | WEIGHT: 253.8 LBS | DIASTOLIC BLOOD PRESSURE: 73 MMHG | HEIGHT: 66 IN

## 2025-02-20 DIAGNOSIS — E55.9 VITAMIN D DEFICIENCY: ICD-10-CM

## 2025-02-20 DIAGNOSIS — Z00.00 ENCOUNTER FOR INITIAL ANNUAL WELLNESS VISIT (AWV) IN MEDICARE PATIENT: Primary | ICD-10-CM

## 2025-02-20 DIAGNOSIS — R31.29 MICROSCOPIC HEMATURIA: ICD-10-CM

## 2025-02-20 DIAGNOSIS — I10 PRIMARY HYPERTENSION: ICD-10-CM

## 2025-02-20 DIAGNOSIS — E66.01 CLASS 3 SEVERE OBESITY DUE TO EXCESS CALORIES WITH SERIOUS COMORBIDITY AND BODY MASS INDEX (BMI) OF 40.0 TO 44.9 IN ADULT: ICD-10-CM

## 2025-02-20 DIAGNOSIS — L29.9 ITCHING: ICD-10-CM

## 2025-02-20 DIAGNOSIS — R73.03 PREDIABETES: ICD-10-CM

## 2025-02-20 DIAGNOSIS — L98.9 SKIN LESION OF BACK: ICD-10-CM

## 2025-02-20 DIAGNOSIS — E78.2 MIXED HYPERLIPIDEMIA: ICD-10-CM

## 2025-02-20 DIAGNOSIS — L85.3 DRY SKIN DERMATITIS: ICD-10-CM

## 2025-02-20 DIAGNOSIS — Z23 ENCOUNTER FOR IMMUNIZATION: ICD-10-CM

## 2025-02-20 DIAGNOSIS — K21.9 GASTROESOPHAGEAL REFLUX DISEASE WITHOUT ESOPHAGITIS: ICD-10-CM

## 2025-02-20 DIAGNOSIS — G25.0 ESSENTIAL TREMOR: ICD-10-CM

## 2025-02-20 DIAGNOSIS — E66.813 CLASS 3 SEVERE OBESITY DUE TO EXCESS CALORIES WITH SERIOUS COMORBIDITY AND BODY MASS INDEX (BMI) OF 40.0 TO 44.9 IN ADULT: ICD-10-CM

## 2025-02-20 DIAGNOSIS — J44.9 CHRONIC OBSTRUCTIVE PULMONARY DISEASE, UNSPECIFIED COPD TYPE (MULTI): ICD-10-CM

## 2025-02-20 DIAGNOSIS — Z71.3 ENCOUNTER FOR WEIGHT LOSS COUNSELING: ICD-10-CM

## 2025-02-20 DIAGNOSIS — R35.1 NOCTURIA: ICD-10-CM

## 2025-02-20 DIAGNOSIS — F41.9 ANXIETY: ICD-10-CM

## 2025-02-20 DIAGNOSIS — F17.210 CIGARETTE NICOTINE DEPENDENCE WITHOUT COMPLICATION: ICD-10-CM

## 2025-02-20 DIAGNOSIS — Z12.31 ENCOUNTER FOR SCREENING MAMMOGRAM FOR BREAST CANCER: ICD-10-CM

## 2025-02-20 DIAGNOSIS — H02.9 LESION OF LEFT LOWER EYELID: ICD-10-CM

## 2025-02-20 DIAGNOSIS — R35.0 URINARY FREQUENCY: ICD-10-CM

## 2025-02-20 DIAGNOSIS — D75.89 MACROCYTOSIS WITHOUT ANEMIA: ICD-10-CM

## 2025-02-20 PROCEDURE — 1160F RVW MEDS BY RX/DR IN RCRD: CPT | Performed by: INTERNAL MEDICINE

## 2025-02-20 PROCEDURE — 99214 OFFICE O/P EST MOD 30 MIN: CPT | Performed by: INTERNAL MEDICINE

## 2025-02-20 PROCEDURE — 1158F ADVNC CARE PLAN TLK DOCD: CPT | Performed by: INTERNAL MEDICINE

## 2025-02-20 PROCEDURE — 3008F BODY MASS INDEX DOCD: CPT | Performed by: INTERNAL MEDICINE

## 2025-02-20 PROCEDURE — 3074F SYST BP LT 130 MM HG: CPT | Performed by: INTERNAL MEDICINE

## 2025-02-20 PROCEDURE — 1123F ACP DISCUSS/DSCN MKR DOCD: CPT | Performed by: INTERNAL MEDICINE

## 2025-02-20 PROCEDURE — 90677 PCV20 VACCINE IM: CPT | Performed by: INTERNAL MEDICINE

## 2025-02-20 PROCEDURE — 1157F ADVNC CARE PLAN IN RCRD: CPT | Performed by: INTERNAL MEDICINE

## 2025-02-20 PROCEDURE — G0402 INITIAL PREVENTIVE EXAM: HCPCS | Performed by: INTERNAL MEDICINE

## 2025-02-20 PROCEDURE — 1159F MED LIST DOCD IN RCRD: CPT | Performed by: INTERNAL MEDICINE

## 2025-02-20 PROCEDURE — 3078F DIAST BP <80 MM HG: CPT | Performed by: INTERNAL MEDICINE

## 2025-02-20 PROCEDURE — 90471 IMMUNIZATION ADMIN: CPT | Performed by: INTERNAL MEDICINE

## 2025-02-20 RX ORDER — NALTREXONE HYDROCHLORIDE 50 MG/1
50 TABLET, FILM COATED ORAL DAILY
Qty: 90 TABLET | Refills: 1 | Status: SHIPPED | OUTPATIENT
Start: 2025-02-20 | End: 2025-08-19

## 2025-02-20 RX ORDER — PROPRANOLOL HYDROCHLORIDE 60 MG/1
60 CAPSULE, EXTENDED RELEASE ORAL DAILY
Qty: 90 CAPSULE | Refills: 3 | Status: SHIPPED | OUTPATIENT
Start: 2025-02-20 | End: 2026-02-20

## 2025-02-20 RX ORDER — OXYBUTYNIN CHLORIDE 5 MG/1
5 TABLET, EXTENDED RELEASE ORAL DAILY
Qty: 90 TABLET | Refills: 1 | Status: SHIPPED | OUTPATIENT
Start: 2025-02-20

## 2025-02-20 RX ORDER — TELMISARTAN 80 MG/1
80 TABLET ORAL DAILY
Qty: 90 TABLET | Refills: 3 | Status: SHIPPED | OUTPATIENT
Start: 2025-02-20 | End: 2026-02-20

## 2025-02-20 RX ORDER — TRIAMCINOLONE ACETONIDE 1 MG/ML
LOTION TOPICAL 2 TIMES DAILY
Qty: 60 ML | Refills: 1 | Status: SHIPPED | OUTPATIENT
Start: 2025-02-20 | End: 2025-03-06

## 2025-02-20 RX ORDER — AMLODIPINE BESYLATE 5 MG/1
5 TABLET ORAL DAILY
Qty: 90 TABLET | Refills: 1 | Status: SHIPPED | OUTPATIENT
Start: 2025-02-20 | End: 2025-08-19

## 2025-02-20 RX ORDER — IBUPROFEN 200 MG
1 TABLET ORAL EVERY 24 HOURS
Qty: 30 PATCH | Refills: 0 | Status: SHIPPED | OUTPATIENT
Start: 2025-02-20 | End: 2025-03-22

## 2025-02-20 RX ORDER — PANTOPRAZOLE SODIUM 40 MG/1
40 TABLET, DELAYED RELEASE ORAL DAILY
Qty: 90 TABLET | Refills: 1 | Status: SHIPPED | OUTPATIENT
Start: 2025-02-20 | End: 2025-08-19

## 2025-02-20 RX ORDER — BUPROPION HYDROCHLORIDE 150 MG/1
150 TABLET, EXTENDED RELEASE ORAL 2 TIMES DAILY
Qty: 180 TABLET | Refills: 1 | Status: SHIPPED | OUTPATIENT
Start: 2025-02-20

## 2025-02-20 RX ORDER — DULOXETIN HYDROCHLORIDE 60 MG/1
60 CAPSULE, DELAYED RELEASE ORAL DAILY
Qty: 90 CAPSULE | Refills: 1 | Status: SHIPPED | OUTPATIENT
Start: 2025-02-20

## 2025-02-20 ASSESSMENT — ENCOUNTER SYMPTOMS
NAUSEA: 0
DIARRHEA: 0
CONFUSION: 0
ABDOMINAL PAIN: 0
CONSTIPATION: 0
VOMITING: 0
LIGHT-HEADEDNESS: 0
SHORTNESS OF BREATH: 0
SORE THROAT: 0
TREMORS: 1
FEVER: 0
CHILLS: 0
DYSURIA: 0
HEMATURIA: 0
AGITATION: 0
PALPITATIONS: 0
FREQUENCY: 1
DIZZINESS: 0
COUGH: 0

## 2025-02-20 NOTE — PROGRESS NOTES
Subjective   Reason for Visit: Shila Botello is an 64 y.o. female here for a Medicare Wellness visit.     Past Medical, Surgical, and Family History reviewed and updated in chart.    Reviewed all medications by prescribing practitioner or clinical pharmacist (such as prescriptions, OTCs, herbal therapies and supplements) and documented in the medical record.    HPI patient here for annual wellness visit today.  Since her initial and annual wellness visit on Medicare.  She is up-to-date on age and gender recommended screening with exception of mammogram which has been ordered as well as CT lung cancer screening which has been ordered.  She does no longer get Pap smears because she has had a hysterectomy per the patient.  She defers bone density screening.  She has obstructive sleep apnea but defers treatment for this as well.  Has a history of coronary artery disease on aspirin.  States she had a remote heart attack in 2007..    Complains of a tremor that she has noticed seems worse over 6 months states that her mother had this as well.  No family history of Parkinson disease patient having issues with shuffling gait.  No cogwheel rigidity on exam.  We discussed trying her on propranolol she has an Apple Watch will be able to monitor her heart rates and blood pressure.  She states at home her blood pressures on her watch around 140s.  Today in the office was around 130/73 on recheck.    Patient is up-to-date on age and gender recommended immunizations with the exception pneumonia vaccine which will be given today should recommend get the RSV vaccine at her pharmacy.    Also states she has multiple skin lesions of the back appear to be seborrheic keratoses.  She would like referral to dermatology because she has constant itching of the area.  Also has a skin lesion on the left lower eyelid she would like removed states she had a benign lesion on the right side removed in the past.    Patient doing well with weight  "loss with Wellbutrin naltrexone states she is lost about 4 pounds since her last office visit.    Patient Care Team:  Yunier Corral DO as PCP - General (Internal Medicine)  [facility]     Review of Systems   Constitutional:  Negative for chills and fever.   HENT:  Negative for sore throat.    Eyes:  Negative for visual disturbance.   Respiratory:  Negative for cough and shortness of breath.    Cardiovascular:  Negative for chest pain, palpitations and leg swelling.   Gastrointestinal:  Negative for abdominal pain, constipation, diarrhea, nausea and vomiting.   Genitourinary:  Positive for frequency. Negative for dysuria and hematuria.   Skin:  Negative for rash.   Neurological:  Positive for tremors. Negative for dizziness, syncope and light-headedness.   Psychiatric/Behavioral:  Negative for agitation and confusion.        Objective   Vitals:  /73 (BP Location: Right arm, Patient Position: Sitting, BP Cuff Size: Large adult)   Pulse 67   Temp 35.9 °C (96.6 °F)   Ht 1.676 m (5' 6\")   Wt 115 kg (253 lb 12.8 oz)   BMI 40.96 kg/m²       Physical Exam  Vitals and nursing note reviewed.   Constitutional:       General: She is not in acute distress.     Appearance: Normal appearance. She is obese. She is not ill-appearing, toxic-appearing or diaphoretic.   HENT:      Head: Normocephalic and atraumatic.      Mouth/Throat:      Mouth: Mucous membranes are moist.      Pharynx: Oropharynx is clear. No oropharyngeal exudate.   Eyes:      Pupils: Pupils are equal, round, and reactive to light.      Comments: Small 3 mm papule to the left lower eyelid pale pink in appearance   Cardiovascular:      Rate and Rhythm: Normal rate and regular rhythm.      Heart sounds: Normal heart sounds.   Pulmonary:      Effort: Pulmonary effort is normal. No respiratory distress.      Breath sounds: Normal breath sounds. No wheezing, rhonchi or rales.   Abdominal:      General: Bowel sounds are " normal. There is no distension.      Palpations: Abdomen is soft.      Tenderness: There is no abdominal tenderness.   Musculoskeletal:      Cervical back: Neck supple.      Right lower leg: No edema.      Left lower leg: No edema.   Lymphadenopathy:      Cervical: No cervical adenopathy.   Skin:     General: Skin is warm and dry.      Coloration: Skin is not jaundiced or pale.      Findings: Lesion (multiple stuck on waxy oval lesions of the back) and rash (dry skin of back with patches of rough scaly skin.) present.   Neurological:      General: No focal deficit present.      Mental Status: She is alert and oriented to person, place, and time.      Cranial Nerves: No cranial nerve deficit.      Sensory: No sensory deficit.      Motor: No weakness.      Coordination: Coordination normal.      Gait: Gait normal.      Comments: Resting tremor of the B/L hands that gets better with use.  No cogwheel rigidity.  No shuffling gait on exam   Psychiatric:         Mood and Affect: Mood normal.         Behavior: Behavior normal.         Thought Content: Thought content normal.         Judgment: Judgment normal.         Assessment & Plan  Primary hypertension    Orders:    amLODIPine (Norvasc) 5 mg tablet; Take 1 tablet (5 mg) by mouth once daily.    telmisartan (Micardis) 80 mg tablet; Take 1 tablet (80 mg) by mouth once daily.    Class 3 severe obesity due to excess calories with serious comorbidity and body mass index (BMI) of 40.0 to 44.9 in adult    Orders:    buPROPion SR (Wellbutrin SR) 150 mg 12 hr tablet; Take 1 tablet (150 mg) by mouth 2 times a day.    naltrexone (Depade) 50 mg tablet; Take 1 tablet (50 mg) by mouth once daily.    Encounter for weight loss counseling    Orders:    buPROPion SR (Wellbutrin SR) 150 mg 12 hr tablet; Take 1 tablet (150 mg) by mouth 2 times a day.    naltrexone (Depade) 50 mg tablet; Take 1 tablet (50 mg) by mouth once daily.    Anxiety    Orders:    DULoxetine (Cymbalta) 60 mg   capsule; Take 1 capsule (60 mg) by mouth once daily.    Urinary frequency    Orders:    oxybutynin XL (Ditropan-XL) 5 mg 24 hr tablet; Take 1 tablet (5 mg) by mouth once daily. DO NOT CRUSH CHEW OR SPLIT    Nocturia    Orders:    oxybutynin XL (Ditropan-XL) 5 mg 24 hr tablet; Take 1 tablet (5 mg) by mouth once daily. DO NOT CRUSH CHEW OR SPLIT    Gastroesophageal reflux disease without esophagitis    Orders:    pantoprazole (ProtoNix) 40 mg EC tablet; Take 1 tablet (40 mg) by mouth once daily.    Chronic obstructive pulmonary disease, unspecified COPD type (Multi)         Encounter for screening mammogram for breast cancer    Orders:    BI mammo bilateral screening tomosynthesis; Future    Cigarette nicotine dependence without complication    Orders:    CT lung screening low dose; Future    nicotine (Nicoderm CQ) 21 mg/24 hr patch; Place 1 patch over 24 hours on the skin once every 24 hours.    Encounter for immunization    Orders:    Pneumococcal conjugate vaccine, 20-valent (PREVNAR 20)    Skin lesion of back    Orders:    Referral to Dermatology    Itching    Orders:    Referral to Dermatology    Lesion of left lower eyelid    Orders:    Referral to Ophthalmology; Future    Essential tremor    Orders:    propranolol LA (Inderal LA) 60 mg 24 hr capsule; Take 1 capsule (60 mg) by mouth once daily. Do not crush, chew, or split.    Mixed hyperlipidemia    Orders:    Lipid panel; Future    Vitamin D deficiency         Macrocytosis without anemia    Orders:    Vitamin B12; Future    Microscopic hematuria    Orders:    Urinalysis with Reflex Microscopic; Future    Prediabetes    Orders:    Hemoglobin A1C; Future    Dry skin dermatitis    Orders:    triamcinolone (Kenalog) 0.1 % lotion; Apply topically 2 times a day for 14 days.    Encounter for initial annual wellness visit (AWV) in Medicare patient       Health maintenance examination: Patient is due for CT lung cancer screening as well as mammogram which has been  ordered.  She is due for pneumonia vaccine which was given in the office today.  Recommend RSV vaccine at her pharmacy    Hypertension: Chronic, stable continue current medication regimen of amlodipine and telmisartan    Anxiety: Chronic, stable continue current medication regimen of duloxetine    Urinary frequency/nocturia: She is still having the symptoms we will check a urinalysis again despite the oxybutynin she may need to see urology we discussed this in detail.  Will discuss further next office visit in 1 month    Gastroesophageal reflux disease: Chronic, stable continue current medication regimen of pantoprazole    COPD: Chronic, stable currently not using inhalers.  We are going to start her on propranolol for essential tremor but will need to watch the COPD as she may have bronchospasm as a nonselective beta-blocker.    Counter for weight loss counseling: Will continue naltrexone and Wellbutrin unfortunately she has not been able to quit smoking we will prescribe nicotine patches we are hoping Wellbutrin would help it with smoking cessation    Dry skin dermatitis: Will start her on triamcinolone and also gave her samples of CeraVe anti-itch lotion.  Referral to dermatology was placed as well due to her seborrheic keratoses that are irritated on her back    Left lower eyelid lesion: She has been referred to oculoplastics for further evaluation and treatment    Essential tremor: We will start her on propranolol she has no signs or symptoms of Parkinson disease.  No family history of this there is a family history of essential tremor in her mother

## 2025-02-20 NOTE — ASSESSMENT & PLAN NOTE
Orders:    DULoxetine (Cymbalta) 60 mg DR capsule; Take 1 capsule (60 mg) by mouth once daily.

## 2025-02-20 NOTE — ASSESSMENT & PLAN NOTE
Orders:    propranolol LA (Inderal LA) 60 mg 24 hr capsule; Take 1 capsule (60 mg) by mouth once daily. Do not crush, chew, or split.

## 2025-02-20 NOTE — ASSESSMENT & PLAN NOTE
Orders:    oxybutynin XL (Ditropan-XL) 5 mg 24 hr tablet; Take 1 tablet (5 mg) by mouth once daily. DO NOT CRUSH CHEW OR SPLIT

## 2025-02-20 NOTE — ASSESSMENT & PLAN NOTE
Health maintenance examination: Patient is due for CT lung cancer screening as well as mammogram which has been ordered.  She is due for pneumonia vaccine which was given in the office today.  Recommend RSV vaccine at her pharmacy    Hypertension: Chronic, stable continue current medication regimen of amlodipine and telmisartan    Anxiety: Chronic, stable continue current medication regimen of duloxetine    Urinary frequency/nocturia: She is still having the symptoms we will check a urinalysis again despite the oxybutynin she may need to see urology we discussed this in detail.  Will discuss further next office visit in 1 month    Gastroesophageal reflux disease: Chronic, stable continue current medication regimen of pantoprazole    COPD: Chronic, stable currently not using inhalers.  We are going to start her on propranolol for essential tremor but will need to watch the COPD as she may have bronchospasm as a nonselective beta-blocker.    Counter for weight loss counseling: Will continue naltrexone and Wellbutrin unfortunately she has not been able to quit smoking we will prescribe nicotine patches we are hoping Wellbutrin would help it with smoking cessation    Dry skin dermatitis: Will start her on triamcinolone and also gave her samples of CeraVe anti-itch lotion.  Referral to dermatology was placed as well due to her seborrheic keratoses that are irritated on her back    Left lower eyelid lesion: She has been referred to oculoplastics for further evaluation and treatment    Essential tremor: We will start her on propranolol she has no signs or symptoms of Parkinson disease.  No family history of this there is a family history of essential tremor in her mother

## 2025-02-20 NOTE — ASSESSMENT & PLAN NOTE
Orders:    amLODIPine (Norvasc) 5 mg tablet; Take 1 tablet (5 mg) by mouth once daily.    telmisartan (Micardis) 80 mg tablet; Take 1 tablet (80 mg) by mouth once daily.

## 2025-02-20 NOTE — ASSESSMENT & PLAN NOTE
Orders:    CT lung screening low dose; Future    nicotine (Nicoderm CQ) 21 mg/24 hr patch; Place 1 patch over 24 hours on the skin once every 24 hours.

## 2025-02-20 NOTE — ASSESSMENT & PLAN NOTE
Orders:    buPROPion SR (Wellbutrin SR) 150 mg 12 hr tablet; Take 1 tablet (150 mg) by mouth 2 times a day.    naltrexone (Depade) 50 mg tablet; Take 1 tablet (50 mg) by mouth once daily.

## 2025-03-03 ENCOUNTER — APPOINTMENT (OUTPATIENT)
Dept: DERMATOLOGY | Facility: CLINIC | Age: 65
End: 2025-03-03
Payer: COMMERCIAL

## 2025-03-24 ENCOUNTER — APPOINTMENT (OUTPATIENT)
Dept: PRIMARY CARE | Facility: CLINIC | Age: 65
End: 2025-03-24
Payer: COMMERCIAL

## 2025-04-16 PROBLEM — Z00.00 HEALTH MAINTENANCE EXAMINATION: Status: ACTIVE | Noted: 2025-04-16

## 2025-04-16 PROBLEM — R82.90 ABNORMAL URINALYSIS: Status: ACTIVE | Noted: 2025-04-16

## 2025-04-16 PROBLEM — H66.002 NON-RECURRENT ACUTE SUPPURATIVE OTITIS MEDIA OF LEFT EAR WITHOUT SPONTANEOUS RUPTURE OF TYMPANIC MEMBRANE: Status: ACTIVE | Noted: 2025-04-16

## 2025-04-16 PROBLEM — Z23 IMMUNIZATION DUE: Status: ACTIVE | Noted: 2025-04-16

## 2025-04-16 ASSESSMENT — ENCOUNTER SYMPTOMS
ABDOMINAL PAIN: 0
NAUSEA: 0
CONSTIPATION: 0
COUGH: 0
CONSTIPATION: 0
VOMITING: 0
VOMITING: 0
SHORTNESS OF BREATH: 0
SORE THROAT: 0
FREQUENCY: 1
DIZZINESS: 0
CONFUSION: 0
CHILLS: 0
FEVER: 0
NAUSEA: 1
SHORTNESS OF BREATH: 0
DIARRHEA: 0
DYSURIA: 0
BLOOD IN STOOL: 0
SORE THROAT: 0
LIGHT-HEADEDNESS: 0
DIARRHEA: 0
AGITATION: 0
COUGH: 0
ABDOMINAL PAIN: 0
AGITATION: 0
FREQUENCY: 1
CONFUSION: 0

## 2025-07-22 ENCOUNTER — APPOINTMENT (OUTPATIENT)
Dept: DERMATOLOGY | Facility: CLINIC | Age: 65
End: 2025-07-22
Payer: COMMERCIAL

## (undated) DEVICE — DRAPE COVER, C ARM, FLOUROSCAN IMAGING SYS

## (undated) DEVICE — DRESSING, TRANSPARENT, TEGADERM, 4 X 4-3/4 IN, NO LABEL

## (undated) DEVICE — HANDLE COVER, LIGHT, RIGID, DISP, LF

## (undated) DEVICE — DRESSING, GAUZE, SPONGE, 12 PLY, CURITY, 4 X 4 IN, RIGID TRAY, STERILE

## (undated) DEVICE — DRAPE, INCISE, ANTIMICROBIAL, IOBAN 2, LARGE, 17 X 23 IN, DISPOSABLE, STERILE

## (undated) DEVICE — DRAPE, SHEET, 17 X 23 IN

## (undated) DEVICE — SLEEVE, VASO PRESS, CALF GARMENT, MEDIUM, GREEN

## (undated) DEVICE — SYRINGE, HYPODERMIC, LEUR LOCK, 3 CC, PLASTIC, STERILE

## (undated) DEVICE — Device

## (undated) DEVICE — WOUND SYSTEM, DEBRIDEMENT & CLEANING, O.R DUOPAK

## (undated) DEVICE — ADHESIVE, SKIN, LIQUIBAND EXCEED

## (undated) DEVICE — APPLICATOR, CHLORAPREP, W/ORANGE TINT, 26ML

## (undated) DEVICE — BAG, BANDED, 36IN X 28IN